# Patient Record
Sex: MALE | Race: WHITE | NOT HISPANIC OR LATINO
[De-identification: names, ages, dates, MRNs, and addresses within clinical notes are randomized per-mention and may not be internally consistent; named-entity substitution may affect disease eponyms.]

---

## 2018-08-22 ENCOUNTER — APPOINTMENT (OUTPATIENT)
Dept: SURGERY | Facility: ASSISTED LIVING FACILITY | Age: 83
End: 2018-08-22
Payer: MEDICARE

## 2018-08-22 PROCEDURE — 99304 1ST NF CARE SF/LOW MDM 25: CPT

## 2018-08-23 PROBLEM — Z00.00 ENCOUNTER FOR PREVENTIVE HEALTH EXAMINATION: Status: ACTIVE | Noted: 2018-08-23

## 2018-08-31 ENCOUNTER — APPOINTMENT (OUTPATIENT)
Dept: SURGERY | Facility: ASSISTED LIVING FACILITY | Age: 83
End: 2018-08-31
Payer: MEDICARE

## 2018-08-31 PROCEDURE — 99307 SBSQ NF CARE SF MDM 10: CPT

## 2018-09-05 ENCOUNTER — APPOINTMENT (OUTPATIENT)
Dept: SURGERY | Facility: ASSISTED LIVING FACILITY | Age: 83
End: 2018-09-05
Payer: MEDICARE

## 2018-09-05 PROCEDURE — 99307 SBSQ NF CARE SF MDM 10: CPT

## 2020-01-02 RX ADMIN — Medication 650 MILLIGRAM(S): at 23:05

## 2020-01-03 ENCOUNTER — INPATIENT (INPATIENT)
Facility: HOSPITAL | Age: 85
LOS: 5 days | Discharge: HOME | DRG: 378 | End: 2020-01-09
Attending: INTERNAL MEDICINE | Admitting: INTERNAL MEDICINE
Payer: MEDICARE

## 2020-01-03 VITALS
OXYGEN SATURATION: 98 % | TEMPERATURE: 99 F | DIASTOLIC BLOOD PRESSURE: 63 MMHG | HEART RATE: 89 BPM | WEIGHT: 121.7 LBS | RESPIRATION RATE: 17 BRPM | SYSTOLIC BLOOD PRESSURE: 154 MMHG

## 2020-01-03 LAB
ALBUMIN SERPL ELPH-MCNC: 2.9 G/DL — LOW (ref 3.3–5)
ALP SERPL-CCNC: 66 U/L — SIGNIFICANT CHANGE UP (ref 40–120)
ALT FLD-CCNC: 24 U/L — SIGNIFICANT CHANGE UP (ref 10–45)
ANION GAP SERPL CALC-SCNC: 12 MMOL/L — SIGNIFICANT CHANGE UP (ref 5–17)
APTT BLD: 35.4 SEC — SIGNIFICANT CHANGE UP (ref 27.5–36.3)
AST SERPL-CCNC: 24 U/L — SIGNIFICANT CHANGE UP (ref 10–40)
BASOPHILS # BLD AUTO: 0 K/UL — SIGNIFICANT CHANGE UP (ref 0–0.2)
BASOPHILS NFR BLD AUTO: 0 % — SIGNIFICANT CHANGE UP (ref 0–2)
BILIRUB SERPL-MCNC: 0.5 MG/DL — SIGNIFICANT CHANGE UP (ref 0.2–1.2)
BLD GP AB SCN SERPL QL: SIGNIFICANT CHANGE UP
BUN SERPL-MCNC: 82 MG/DL — HIGH (ref 7–23)
CALCIUM SERPL-MCNC: 8.8 MG/DL — SIGNIFICANT CHANGE UP (ref 8.4–10.5)
CHLORIDE SERPL-SCNC: 110 MMOL/L — HIGH (ref 96–108)
CO2 SERPL-SCNC: 20 MMOL/L — LOW (ref 22–31)
CREAT SERPL-MCNC: 2.36 MG/DL — HIGH (ref 0.5–1.3)
EOSINOPHIL # BLD AUTO: 0.01 K/UL — SIGNIFICANT CHANGE UP (ref 0–0.5)
EOSINOPHIL NFR BLD AUTO: 0.1 % — SIGNIFICANT CHANGE UP (ref 0–6)
GLUCOSE SERPL-MCNC: 111 MG/DL — HIGH (ref 70–99)
HCT VFR BLD CALC: 25.3 % — LOW (ref 39–50)
HGB BLD-MCNC: 8.2 G/DL — LOW (ref 13–17)
IMM GRANULOCYTES NFR BLD AUTO: 0.6 % — SIGNIFICANT CHANGE UP (ref 0–1.5)
INR BLD: 1.7 RATIO — HIGH (ref 0.88–1.16)
LACTATE SERPL-SCNC: 1.3 MMOL/L — SIGNIFICANT CHANGE UP (ref 0.7–2)
LYMPHOCYTES # BLD AUTO: 0.63 K/UL — LOW (ref 1–3.3)
LYMPHOCYTES # BLD AUTO: 9.4 % — LOW (ref 13–44)
MCHC RBC-ENTMCNC: 32.4 GM/DL — SIGNIFICANT CHANGE UP (ref 32–36)
MCHC RBC-ENTMCNC: 32.8 PG — SIGNIFICANT CHANGE UP (ref 27–34)
MCV RBC AUTO: 101.2 FL — HIGH (ref 80–100)
MONOCYTES # BLD AUTO: 0.69 K/UL — SIGNIFICANT CHANGE UP (ref 0–0.9)
MONOCYTES NFR BLD AUTO: 10.3 % — SIGNIFICANT CHANGE UP (ref 2–14)
NEUTROPHILS # BLD AUTO: 5.35 K/UL — SIGNIFICANT CHANGE UP (ref 1.8–7.4)
NEUTROPHILS NFR BLD AUTO: 79.6 % — HIGH (ref 43–77)
NRBC # BLD: 0 /100 WBCS — SIGNIFICANT CHANGE UP (ref 0–0)
OB PNL STL: POSITIVE
PLATELET # BLD AUTO: 136 K/UL — LOW (ref 150–400)
POTASSIUM SERPL-MCNC: 5.5 MMOL/L — HIGH (ref 3.5–5.3)
POTASSIUM SERPL-SCNC: 5.5 MMOL/L — HIGH (ref 3.5–5.3)
PROT SERPL-MCNC: 6.9 G/DL — SIGNIFICANT CHANGE UP (ref 6–8.3)
PROTHROM AB SERPL-ACNC: 19.4 SEC — HIGH (ref 10–12.9)
RBC # BLD: 2.5 M/UL — LOW (ref 4.2–5.8)
RBC # FLD: 13.7 % — SIGNIFICANT CHANGE UP (ref 10.3–14.5)
SODIUM SERPL-SCNC: 142 MMOL/L — SIGNIFICANT CHANGE UP (ref 135–145)
WBC # BLD: 6.72 K/UL — SIGNIFICANT CHANGE UP (ref 3.8–10.5)
WBC # FLD AUTO: 6.72 K/UL — SIGNIFICANT CHANGE UP (ref 3.8–10.5)

## 2020-01-03 PROCEDURE — 93010 ELECTROCARDIOGRAM REPORT: CPT

## 2020-01-03 PROCEDURE — 71045 X-RAY EXAM CHEST 1 VIEW: CPT | Mod: 26

## 2020-01-03 PROCEDURE — 99291 CRITICAL CARE FIRST HOUR: CPT

## 2020-01-03 RX ORDER — SODIUM CHLORIDE 9 MG/ML
1700 INJECTION INTRAMUSCULAR; INTRAVENOUS; SUBCUTANEOUS ONCE
Refills: 0 | Status: COMPLETED | OUTPATIENT
Start: 2020-01-03 | End: 2020-01-03

## 2020-01-03 RX ORDER — ACETAMINOPHEN 500 MG
650 TABLET ORAL ONCE
Refills: 0 | Status: COMPLETED | OUTPATIENT
Start: 2020-01-03 | End: 2020-01-02

## 2020-01-03 RX ADMIN — SODIUM CHLORIDE 1700 MILLILITER(S): 9 INJECTION INTRAMUSCULAR; INTRAVENOUS; SUBCUTANEOUS at 23:00

## 2020-01-03 NOTE — ED ADULT NURSE NOTE - PMH
Atrial fibrillation    BPH (benign prostatic hyperplasia)    CKD (chronic kidney disease)    Heart disease    HTN (hypertension)    Hyperkalemia    Hyperlipidemia    Insomnia    Pulmonary embolism

## 2020-01-03 NOTE — ED PROVIDER NOTE - CARE PLAN
Principal Discharge DX:	Gastrointestinal hemorrhage with melena  Secondary Diagnosis:	Cystitis with hematuria

## 2020-01-03 NOTE — ED ADULT NURSE NOTE - NSIMPLEMENTINTERV_GEN_ALL_ED
Implemented All Fall with Harm Risk Interventions:  [place] to call system. Call bell, personal items and telephone within reach. Instruct patient to call for assistance. Room bathroom lighting operational. Non-slip footwear when patient is off stretcher. Physically safe environment: no spills, clutter or unnecessary equipment. Stretcher in lowest position, wheels locked, appropriate side rails in place. Provide visual cue, wrist band, yellow gown, etc. Monitor gait and stability. Monitor for mental status changes and reorient to person, place, and time. Review medications for side effects contributing to fall risk. Reinforce activity limits and safety measures with patient and family. Provide visual clues: red socks.

## 2020-01-03 NOTE — ED PROVIDER NOTE - CLINICAL SUMMARY MEDICAL DECISION MAKING FREE TEXT BOX
pt is 89 old with h/o Afib, not on any anticoagulant , P/W gross hematuria and dark stool, with low H/H, pt having acute blood loss from 2 sources, and on CBI, needs frequent monitoring of H/H, iCU consult was called and pt was admitted to ICU, family (son) was called to discuss goals of care but was no answer

## 2020-01-03 NOTE — ED PROVIDER NOTE - OBJECTIVE STATEMENT
89 y/o male with h/o AFIB , BIB ems from [facility] for evaluation of bleeding from penis, pt also has low H/H about HB 0f 7 done today. pt denies any pain. pt is very old and possibly has some dementia so unable to provide detail history.

## 2020-01-03 NOTE — ED ADULT NURSE NOTE - OBJECTIVE STATEMENT
89 yr old male BIB EMS from [facility] for nursing and rehab c/o hematuria and abnormal hemoglobin today; pt denies any complaints at this time

## 2020-01-04 DIAGNOSIS — K92.1 MELENA: ICD-10-CM

## 2020-01-04 LAB
-  CANDIDA ALBICANS: SIGNIFICANT CHANGE UP
-  CANDIDA GLABRATA: SIGNIFICANT CHANGE UP
-  CANDIDA KRUSEI: SIGNIFICANT CHANGE UP
-  CANDIDA PARAPSILOSIS: SIGNIFICANT CHANGE UP
-  CANDIDA TROPICALIS: SIGNIFICANT CHANGE UP
-  COAGULASE NEGATIVE STAPHYLOCOCCUS: SIGNIFICANT CHANGE UP
-  K. PNEUMONIAE GROUP: SIGNIFICANT CHANGE UP
-  KPC RESISTANCE GENE: SIGNIFICANT CHANGE UP
-  STREPTOCOCCUS SP. (NOT GRP A, B OR S PNEUMONIAE): SIGNIFICANT CHANGE UP
A BAUMANNII DNA SPEC QL NAA+PROBE: SIGNIFICANT CHANGE UP
ANION GAP SERPL CALC-SCNC: 11 MMOL/L — SIGNIFICANT CHANGE UP (ref 5–17)
ANION GAP SERPL CALC-SCNC: 11 MMOL/L — SIGNIFICANT CHANGE UP (ref 5–17)
APPEARANCE UR: ABNORMAL
BACTERIA # UR AUTO: ABNORMAL /HPF
BILIRUB UR-MCNC: NEGATIVE — SIGNIFICANT CHANGE UP
BUN SERPL-MCNC: 72 MG/DL — HIGH (ref 7–23)
BUN SERPL-MCNC: 77 MG/DL — HIGH (ref 7–23)
CALCIUM SERPL-MCNC: 8.3 MG/DL — LOW (ref 8.4–10.5)
CALCIUM SERPL-MCNC: 8.3 MG/DL — LOW (ref 8.4–10.5)
CHLORIDE SERPL-SCNC: 114 MMOL/L — HIGH (ref 96–108)
CHLORIDE SERPL-SCNC: 114 MMOL/L — HIGH (ref 96–108)
CO2 SERPL-SCNC: 20 MMOL/L — LOW (ref 22–31)
CO2 SERPL-SCNC: 21 MMOL/L — LOW (ref 22–31)
COLOR SPEC: ABNORMAL
CREAT SERPL-MCNC: 2.07 MG/DL — HIGH (ref 0.5–1.3)
CREAT SERPL-MCNC: 2.16 MG/DL — HIGH (ref 0.5–1.3)
DIFF PNL FLD: ABNORMAL
E CLOAC COMP DNA BLD POS QL NAA+PROBE: SIGNIFICANT CHANGE UP
E COLI DNA BLD POS QL NAA+NON-PROBE: SIGNIFICANT CHANGE UP
ENTEROCOC DNA BLD POS QL NAA+NON-PROBE: SIGNIFICANT CHANGE UP
ENTEROCOC DNA BLD POS QL NAA+NON-PROBE: SIGNIFICANT CHANGE UP
EPI CELLS # UR: SIGNIFICANT CHANGE UP
GLUCOSE SERPL-MCNC: 121 MG/DL — HIGH (ref 70–99)
GLUCOSE SERPL-MCNC: 98 MG/DL — SIGNIFICANT CHANGE UP (ref 70–99)
GLUCOSE UR QL: NEGATIVE — SIGNIFICANT CHANGE UP
GP B STREP DNA BLD POS QL NAA+NON-PROBE: SIGNIFICANT CHANGE UP
GRAM STN FLD: SIGNIFICANT CHANGE UP
HAEM INFLU DNA BLD POS QL NAA+NON-PROBE: SIGNIFICANT CHANGE UP
HCT VFR BLD CALC: 20.6 % — CRITICAL LOW (ref 39–50)
HCT VFR BLD CALC: 27.7 % — LOW (ref 39–50)
HGB BLD-MCNC: 6.6 G/DL — CRITICAL LOW (ref 13–17)
HGB BLD-MCNC: 9.2 G/DL — LOW (ref 13–17)
INR BLD: 1.62 RATIO — HIGH (ref 0.88–1.16)
K OXYTOCA DNA BLD POS QL NAA+NON-PROBE: SIGNIFICANT CHANGE UP
KETONES UR-MCNC: ABNORMAL
L MONOCYTOG DNA BLD POS QL NAA+NON-PROBE: SIGNIFICANT CHANGE UP
LEUKOCYTE ESTERASE UR-ACNC: ABNORMAL
MAGNESIUM SERPL-MCNC: 2.4 MG/DL — SIGNIFICANT CHANGE UP (ref 1.6–2.6)
MCHC RBC-ENTMCNC: 32 GM/DL — SIGNIFICANT CHANGE UP (ref 32–36)
MCHC RBC-ENTMCNC: 32.7 PG — SIGNIFICANT CHANGE UP (ref 27–34)
MCHC RBC-ENTMCNC: 32.7 PG — SIGNIFICANT CHANGE UP (ref 27–34)
MCHC RBC-ENTMCNC: 33.2 GM/DL — SIGNIFICANT CHANGE UP (ref 32–36)
MCV RBC AUTO: 102 FL — HIGH (ref 80–100)
MCV RBC AUTO: 98.6 FL — SIGNIFICANT CHANGE UP (ref 80–100)
METHOD TYPE: SIGNIFICANT CHANGE UP
MRSA SPEC QL CULT: SIGNIFICANT CHANGE UP
MSSA DNA SPEC QL NAA+PROBE: SIGNIFICANT CHANGE UP
N MEN ISLT CULT: SIGNIFICANT CHANGE UP
NITRITE UR-MCNC: NEGATIVE — SIGNIFICANT CHANGE UP
NRBC # BLD: 0 /100 WBCS — SIGNIFICANT CHANGE UP (ref 0–0)
NRBC # BLD: 0 /100 WBCS — SIGNIFICANT CHANGE UP (ref 0–0)
P AERUGINOSA DNA BLD POS NAA+NON-PROBE: SIGNIFICANT CHANGE UP
PH UR: 9 — HIGH (ref 5–8)
PHOSPHATE SERPL-MCNC: 5.1 MG/DL — HIGH (ref 2.5–4.5)
PLATELET # BLD AUTO: 113 K/UL — LOW (ref 150–400)
PLATELET # BLD AUTO: 119 K/UL — LOW (ref 150–400)
POTASSIUM SERPL-MCNC: 4.9 MMOL/L — SIGNIFICANT CHANGE UP (ref 3.5–5.3)
POTASSIUM SERPL-MCNC: 5.5 MMOL/L — HIGH (ref 3.5–5.3)
POTASSIUM SERPL-SCNC: 4.9 MMOL/L — SIGNIFICANT CHANGE UP (ref 3.5–5.3)
POTASSIUM SERPL-SCNC: 5.5 MMOL/L — HIGH (ref 3.5–5.3)
PROCALCITONIN SERPL-MCNC: 0.59 NG/ML — HIGH
PROT UR-MCNC: 500 MG/DL
PROTHROM AB SERPL-ACNC: 18.4 SEC — HIGH (ref 10–12.9)
RBC # BLD: 2.02 M/UL — LOW (ref 4.2–5.8)
RBC # BLD: 2.81 M/UL — LOW (ref 4.2–5.8)
RBC # FLD: 13.9 % — SIGNIFICANT CHANGE UP (ref 10.3–14.5)
RBC # FLD: 15 % — HIGH (ref 10.3–14.5)
RBC CASTS # UR COMP ASSIST: >50 /HPF (ref 0–4)
S MARCESCENS DNA BLD POS NAA+NON-PROBE: SIGNIFICANT CHANGE UP
S PNEUM DNA BLD POS QL NAA+NON-PROBE: SIGNIFICANT CHANGE UP
S PYO DNA BLD POS QL NAA+NON-PROBE: SIGNIFICANT CHANGE UP
SODIUM SERPL-SCNC: 145 MMOL/L — SIGNIFICANT CHANGE UP (ref 135–145)
SODIUM SERPL-SCNC: 146 MMOL/L — HIGH (ref 135–145)
SP GR SPEC: 1.01 — SIGNIFICANT CHANGE UP (ref 1.01–1.02)
SPECIMEN SOURCE: SIGNIFICANT CHANGE UP
UROBILINOGEN FLD QL: NEGATIVE — SIGNIFICANT CHANGE UP
WBC # BLD: 4.31 K/UL — SIGNIFICANT CHANGE UP (ref 3.8–10.5)
WBC # BLD: 7.72 K/UL — SIGNIFICANT CHANGE UP (ref 3.8–10.5)
WBC # FLD AUTO: 4.31 K/UL — SIGNIFICANT CHANGE UP (ref 3.8–10.5)
WBC # FLD AUTO: 7.72 K/UL — SIGNIFICANT CHANGE UP (ref 3.8–10.5)
WBC UR QL: ABNORMAL /HPF (ref 0–5)

## 2020-01-04 PROCEDURE — 74176 CT ABD & PELVIS W/O CONTRAST: CPT | Mod: 26

## 2020-01-04 PROCEDURE — 76770 US EXAM ABDO BACK WALL COMP: CPT | Mod: 26

## 2020-01-04 RX ORDER — INSULIN HUMAN 100 [IU]/ML
10 INJECTION, SOLUTION SUBCUTANEOUS ONCE
Refills: 0 | Status: COMPLETED | OUTPATIENT
Start: 2020-01-04 | End: 2020-01-04

## 2020-01-04 RX ORDER — CHLORHEXIDINE GLUCONATE 213 G/1000ML
1 SOLUTION TOPICAL
Refills: 0 | Status: DISCONTINUED | OUTPATIENT
Start: 2020-01-04 | End: 2020-01-05

## 2020-01-04 RX ORDER — ESCITALOPRAM OXALATE 10 MG/1
0 TABLET, FILM COATED ORAL
Qty: 0 | Refills: 0 | DISCHARGE

## 2020-01-04 RX ORDER — DEXTROSE 50 % IN WATER 50 %
50 SYRINGE (ML) INTRAVENOUS ONCE
Refills: 0 | Status: COMPLETED | OUTPATIENT
Start: 2020-01-04 | End: 2020-01-04

## 2020-01-04 RX ORDER — PANTOPRAZOLE SODIUM 20 MG/1
40 TABLET, DELAYED RELEASE ORAL
Refills: 0 | Status: DISCONTINUED | OUTPATIENT
Start: 2020-01-04 | End: 2020-01-09

## 2020-01-04 RX ORDER — FERROUS SULFATE 325(65) MG
0 TABLET ORAL
Qty: 0 | Refills: 0 | DISCHARGE

## 2020-01-04 RX ORDER — SENNA PLUS 8.6 MG/1
1 TABLET ORAL
Qty: 0 | Refills: 0 | DISCHARGE

## 2020-01-04 RX ORDER — LANOLIN ALCOHOL/MO/W.PET/CERES
1 CREAM (GRAM) TOPICAL
Qty: 0 | Refills: 0 | DISCHARGE

## 2020-01-04 RX ORDER — PIPERACILLIN AND TAZOBACTAM 4; .5 G/20ML; G/20ML
3.38 INJECTION, POWDER, LYOPHILIZED, FOR SOLUTION INTRAVENOUS EVERY 8 HOURS
Refills: 0 | Status: DISCONTINUED | OUTPATIENT
Start: 2020-01-04 | End: 2020-01-05

## 2020-01-04 RX ORDER — PHYTONADIONE (VIT K1) 5 MG
5 TABLET ORAL ONCE
Refills: 0 | Status: COMPLETED | OUTPATIENT
Start: 2020-01-04 | End: 2020-01-04

## 2020-01-04 RX ORDER — CEFTRIAXONE 500 MG/1
1000 INJECTION, POWDER, FOR SOLUTION INTRAMUSCULAR; INTRAVENOUS ONCE
Refills: 0 | Status: COMPLETED | OUTPATIENT
Start: 2020-01-04 | End: 2020-01-04

## 2020-01-04 RX ORDER — ESCITALOPRAM OXALATE 10 MG/1
5 TABLET, FILM COATED ORAL DAILY
Refills: 0 | Status: DISCONTINUED | OUTPATIENT
Start: 2020-01-04 | End: 2020-01-09

## 2020-01-04 RX ORDER — PANTOPRAZOLE SODIUM 20 MG/1
80 TABLET, DELAYED RELEASE ORAL ONCE
Refills: 0 | Status: COMPLETED | OUTPATIENT
Start: 2020-01-04 | End: 2020-01-04

## 2020-01-04 RX ORDER — PIPERACILLIN AND TAZOBACTAM 4; .5 G/20ML; G/20ML
3.38 INJECTION, POWDER, LYOPHILIZED, FOR SOLUTION INTRAVENOUS ONCE
Refills: 0 | Status: COMPLETED | OUTPATIENT
Start: 2020-01-04 | End: 2020-01-04

## 2020-01-04 RX ORDER — AMLODIPINE BESYLATE 2.5 MG/1
1 TABLET ORAL
Qty: 0 | Refills: 0 | DISCHARGE

## 2020-01-04 RX ORDER — CEFTRIAXONE 500 MG/1
1000 INJECTION, POWDER, FOR SOLUTION INTRAMUSCULAR; INTRAVENOUS EVERY 24 HOURS
Refills: 0 | Status: DISCONTINUED | OUTPATIENT
Start: 2020-01-04 | End: 2020-01-09

## 2020-01-04 RX ADMIN — CHLORHEXIDINE GLUCONATE 1 APPLICATION(S): 213 SOLUTION TOPICAL at 06:21

## 2020-01-04 RX ADMIN — Medication 650 MILLIGRAM(S): at 00:05

## 2020-01-04 RX ADMIN — CEFTRIAXONE 100 MILLIGRAM(S): 500 INJECTION, POWDER, FOR SOLUTION INTRAMUSCULAR; INTRAVENOUS at 00:29

## 2020-01-04 RX ADMIN — CEFTRIAXONE 100 MILLIGRAM(S): 500 INJECTION, POWDER, FOR SOLUTION INTRAMUSCULAR; INTRAVENOUS at 16:00

## 2020-01-04 RX ADMIN — Medication 101 MILLIGRAM(S): at 03:24

## 2020-01-04 RX ADMIN — Medication 50 MILLILITER(S): at 08:04

## 2020-01-04 RX ADMIN — PIPERACILLIN AND TAZOBACTAM 200 GRAM(S): 4; .5 INJECTION, POWDER, LYOPHILIZED, FOR SOLUTION INTRAVENOUS at 23:03

## 2020-01-04 RX ADMIN — PANTOPRAZOLE SODIUM 80 MILLIGRAM(S): 20 TABLET, DELAYED RELEASE ORAL at 00:29

## 2020-01-04 RX ADMIN — INSULIN HUMAN 10 UNIT(S): 100 INJECTION, SOLUTION SUBCUTANEOUS at 08:04

## 2020-01-04 RX ADMIN — ESCITALOPRAM OXALATE 5 MILLIGRAM(S): 10 TABLET, FILM COATED ORAL at 12:11

## 2020-01-04 RX ADMIN — PANTOPRAZOLE SODIUM 40 MILLIGRAM(S): 20 TABLET, DELAYED RELEASE ORAL at 17:16

## 2020-01-04 RX ADMIN — SODIUM CHLORIDE 1700 MILLILITER(S): 9 INJECTION INTRAMUSCULAR; INTRAVENOUS; SUBCUTANEOUS at 00:00

## 2020-01-04 RX ADMIN — PANTOPRAZOLE SODIUM 40 MILLIGRAM(S): 20 TABLET, DELAYED RELEASE ORAL at 05:14

## 2020-01-04 RX ADMIN — CEFTRIAXONE 1000 MILLIGRAM(S): 500 INJECTION, POWDER, FOR SOLUTION INTRAMUSCULAR; INTRAVENOUS at 00:59

## 2020-01-04 NOTE — PROVIDER CONTACT NOTE (EICU) - BACKGROUND
a 89M with a history of A fib, unclear whether he is on ASA or AC at baseline p/w bleeding from penis and UGIB as per ED attg.  Hemodynamics stable, Hb stable at 8.2, however, INR 1.7, PTT 34, Cr 2.4, K 5.5 (unclear baseline), but likely acute.  Lactate 1.3.  As per ED attg, pt may have a DNR order, but there was no paperwork sent from NH and he was not able to contact family.

## 2020-01-04 NOTE — H&P ADULT - NSICDXPASTMEDICALHX_GEN_ALL_CORE_FT
PAST MEDICAL HISTORY:  Atrial fibrillation     BPH (benign prostatic hyperplasia)     CKD (chronic kidney disease)     Heart disease     HTN (hypertension)     Hyperkalemia     Hyperlipidemia     Insomnia     Pulmonary embolism

## 2020-01-04 NOTE — PATIENT PROFILE ADULT - OVER THE PAST TWO WEEKS HAVE YOU FELT DOWN, DEPRESSED OR HOPELESS?
Call from Dr Cruz with [facility] for Reclast 5mg yearly for Dx of Osteoporosis, and he notes he does not feel that any other oral medication would be effective due to hx of Gastric Bypass, he feels she wouldn't absorb. Plan entered. Note to [facility] to call patient and get her on the schedule.   
no

## 2020-01-04 NOTE — H&P ADULT - NSHPPHYSICALEXAM_GEN_ALL_CORE
GENERAL: elderly cachetic NAD, well-groomed, well-developed  HEAD:  Atraumatic, Normocephalic  EYES: EOMI, PERRLA, conjunctiva and sclera clear  ENMT: Moist mucous membranes, Good dentition, No lesions  NECK: Supple, No JVD, Normal thyroid  NERVOUS SYSTEM:  Alert & Oriented X3, Good concentration; Motor Strength 5/5 B/L upper and lower extremities; DTRs 2+ intact and symmetric  CHEST/LUNG: CTA bilaterally; No rales, rhonchi, wheezing, or rubs  HEART: Regular rate and rhythm; No murmurs, rubs, or gallops  ABDOMEN: Soft, +tenderness to deep palpation in RLQ, Nondistended; Bowel sounds present + melena + hematuria from batista  EXTREMITIES:  2+ Peripheral Pulses, No clubbing, cyanosis, or edema

## 2020-01-04 NOTE — PROVIDER CONTACT NOTE (EICU) - RECOMMENDATIONS
1. Consider palliative care consult.  2.  No indication of RBC transfusion at this moment.  3. Hold AC.

## 2020-01-04 NOTE — H&P ADULT - ASSESSMENT
ASSESSMENT   89 y/o M PMH Afib BIBEMS from [facility] for evaluation of bleeding from penis, pt also has low H/H about hbg of 7 done today. Pt was noted to have bout of melena in ED as well as gross hematuria. Pt placed on CBI for irrigation. Pt has elevated INR 1.7, but denies taking AC any time recently, states was on coumadin, but was stopped many years ago. Pt admitted to the ICU for ABLA and coagulopathy with hematuria and melena. Will monitor closely and trend CBC, and continuous irrigation with CBI  1. ABLA   2. Melena   3. GIB   4. Hematuria   5. WILVER    PLAN     NEURO:   -Continue lexapro    PULM:    -WILY     CV:   -afib rate controlled at this time    GI:    -NPO   -Advance diet as tolerated   -Protonix BID     :   -CBI    ENDO:   -WILY    ID:   WILY     HEME/DVT PPX:   SCD  -trend CBC q6h   -Vit K   -FFP     ETHICS        CODE STATUS: DNR  GOC discussion: Y    Critical Care time: 40 mins assessing presenting problems of acute illness that poses high probability of life threatening deterioration or end organ damage/dysfunction.  Medical decision making including Initiating plan of care, reviewing data, reviewing radiology, direct patient bedside evaluation and interpretation of vital signs, any necessary ventilator management , discussion with multidisciplinary team, discussing goals of care with patient/family, all non inclusive of procedures     Care discussed with bedside provider and eICU attending. If any additional assistance in care/management of patient required by bedside team, provider/RN/family member advised to push "e-alert" button in patient's room, and details will be discussed at that time

## 2020-01-04 NOTE — H&P ADULT - NSHPLABSRESULTS_GEN_ALL_CORE
Lab Results:  CBC  CBC Full  -  ( 2020 23:00 )  WBC Count : 6.72 K/uL  RBC Count : 2.50 M/uL  Hemoglobin : 8.2 g/dL  Hematocrit : 25.3 %  Platelet Count - Automated : 136 K/uL  Mean Cell Volume : 101.2 fl  Mean Cell Hemoglobin : 32.8 pg  Mean Cell Hemoglobin Concentration : 32.4 gm/dL  Auto Neutrophil # : 5.35 K/uL  Auto Lymphocyte # : 0.63 K/uL  Auto Monocyte # : 0.69 K/uL  Auto Eosinophil # : 0.01 K/uL  Auto Basophil # : 0.00 K/uL  Auto Neutrophil % : 79.6 %  Auto Lymphocyte % : 9.4 %  Auto Monocyte % : 10.3 %  Auto Eosinophil % : 0.1 %  Auto Basophil % : 0.0 %    .		Differential:	[] Automated		[] Manual  Chemistry                        8.2    6.72  )-----------( 136      ( 2020 23:00 )             25.3     01-03    142  |  110<H>  |  82<H>  ----------------------------<  111<H>  5.5<H>   |  20<L>  |  2.36<H>    Ca    8.8      2020 23:00    TPro  6.9  /  Alb  2.9<L>  /  TBili  0.5  /  DBili  x   /  AST  24  /  ALT  24  /  AlkPhos  66  01-03    LIVER FUNCTIONS - ( 2020 23:00 )  Alb: 2.9 g/dL / Pro: 6.9 g/dL / ALK PHOS: 66 U/L / ALT: 24 U/L / AST: 24 U/L / GGT: x           PT/INR - ( 2020 23:00 )   PT: 19.4 sec;   INR: 1.70 ratio         PTT - ( 2020 23:00 )  PTT:35.4 sec  Urinalysis Basic - ( 2020 00:26 )    Color: Red / Appearance: bloody / S.015 / pH: x  Gluc: x / Ketone: Trace  / Bili: Negative / Urobili: Negative   Blood: x / Protein: 500 mg/dL / Nitrite: Negative   Leuk Esterase: Trace / RBC: >50 /HPF / WBC 6-10 /HPF   Sq Epi: x / Non Sq Epi: Neg.-Few / Bacteria: TNTC /HPF      MICROBIOLOGY/CULTURES:      RADIOLOGY RESULTS:  pending CT abdomen

## 2020-01-04 NOTE — H&P ADULT - HISTORY OF PRESENT ILLNESS
89 y/o M PMH Afib BIBEMS from NH for evaluation of bleeding from penis, pt also has low H/H about hbg of 7 done today. Pt was noted to have bout of melena in ED as well as gross hematuria. Pt placed on CBI for irrigation. Pt has elevated INR 1.7, but denies taking AC any time recently, states was on coumadin, but was stopped many years ago. Pt admitted to the ICU for ABLA and coagulopathy with hematuria and melena. Will monitor closely and trend CBC, and continuous irrigation with CBI

## 2020-01-04 NOTE — PROGRESS NOTE ADULT - ASSESSMENT
Physical Exam  GENERAL: elderly cachetic NAD, well-groomed, well-developed  HEAD:  Atraumatic, Normocephalic  EYES: EOMI, PERRLA, conjunctiva and sclera clear  ENMT: Moist mucous membranes, Good dentition, No lesions  NECK: Supple, No JVD, Normal thyroid  NERVOUS SYSTEM:  Alert & Oriented X3,Motor Strength 5/5 B/L upper and lower extremities; DTRs 2+ intact and symmetric  CHEST/LUNG: CTA bilaterally; No rales, rhonchi, wheezing, or rubs  HEART: Regular rate and rhythm; No murmurs, rubs, or gallops  ABDOMEN: Soft, +tenderness to deep palpation in RLQ, Nondistended; Bowel sounds present, + batista with blood tinged urine  EXTREMITIES:  2+ Peripheral Pulses, No clubbing, cyanosis, or edema    Assessment:  1. Hematuria   2. Acute blood loss anemia  3. Melena   4. CAD  5. Afib  6. Hx of bradycardia   7. Chronic kidney disease stage 4    - Cont. to monitor hematuria  - Check US of the kidney/bladder  - Urology consult  - Cont. CBI  - Monitor hgb   - Cont. Monitor heart rate, history of bradycardia per NH papers, hemodynamically stable for now  - Hold all AV sarmad agents, avoid aspirin given bradycardia  - Cont home psych medications  - Monitor urine output, history of CKD stage 4 at home, likely baseline creatinine  - SCD for DVT prophylaxis   - Oral diet

## 2020-01-04 NOTE — ED ADULT NURSE REASSESSMENT NOTE - NS ED NURSE REASSESS COMMENT FT1
CBI in progress; some small clots removed; pt tolerating well; catheter patent at this time; draining well; continue to monitor

## 2020-01-04 NOTE — PROGRESS NOTE ADULT - SUBJECTIVE AND OBJECTIVE BOX
Follow-up Critical Care Progress Note  Chief Complaint : Melena    Admitted overnight with hematuria. Appears comfortable, has CBI underway. Denies any pain, any abdominal pain, no nausea or vomiting. Reported with bradycardia    Allergies :aspirin (Unknown)  penicillin (Unknown)    PAST MEDICAL & SURGICAL HISTORY:  Insomnia  Hyperkalemia  CKD (chronic kidney disease)  BPH (benign prostatic hyperplasia)  Hyperlipidemia  Pulmonary embolism  Heart disease  HTN (hypertension)  Atrial fibrillation    Medications:  MEDICATIONS  (STANDING):  chlorhexidine 4% Liquid 1 Application(s) Topical <User Schedule>  escitalopram 5 milliGRAM(s) Oral daily  pantoprazole  Injectable 40 milliGRAM(s) IV Push two times a day    MEDICATIONS  (PRN):    LABS:                      6.6    4.31  )-----------( 113      ( 2020 04:30 )             20.6     01-04  145  |  114<H>  |  77<H>  ----------------------------<  121<H>  5.5<H>   |  20<L>  |  2.16<H>    Ca    8.3<L>      2020 04:30  Phos  5.1       Mg     2.4     -04    TPro  6.9  /  Alb  2.9<L>  /  TBili  0.5  /  DBili  x   /  AST  24  /  ALT  24  /  AlkPhos  66  01-03    PT/INR - ( 2020 04:30 )   PT: 18.4 sec;   INR: 1.62 ratio       PTT - ( 2020 23:00 )  PTT:35.4 sec  Urinalysis Basic - ( 2020 00:26 )    Color: Red / Appearance: bloody / S.015 / pH: x  Gluc: x / Ketone: Trace  / Bili: Negative / Urobili: Negative   Blood: x / Protein: 500 mg/dL / Nitrite: Negative   Leuk Esterase: Trace / RBC: >50 /HPF / WBC 6-10 /HPF   Sq Epi: x / Non Sq Epi: Neg.-Few / Bacteria: TNTC /HPF    Procalcitonin, Serum: 0.59 ng/mL (20 @ 23:00)    VITALS:  T(C): 36.7 (20 @ 07:00), Max: 38.1 (20 @ 23:00)  T(F): 98 (20 @ 07:00), Max: 100.6 (20 @ 23:00)  HR: 50 (20 09:00) (50 - 93)  BP: 116/56 (20 @ 09:00) (108/44 - 164/60)  BP(mean): 74 (20 09:00) (61 - 83)  ABP: --  ABP(mean): --  RR: 17 (20 @ 09:00) (15 - 29)  SpO2: 100% (20 09:00) (96% - 100%)  CVP(mm Hg): --  CVP(cm H2O): --    Ins and Outs     20 @ 07:01  -  20 @ 07:00  --------------------------------------------------------  IN: 9329 mL / OUT: 54904 mL / NET: -1171 mL        Height (cm): 167.6 (20 @ 02:15)  Weight (kg): 55.1 (20 @ 02:15)  BMI (kg/m2): 19.6 (20 @ 02:15)        I&O's Detail    2020 07:01  -  2020 07:00  --------------------------------------------------------  IN:    Continuous Bladder Irrigation: 9000 mL    Plasma: 329 mL  Total IN: 9329 mL    OUT:    Continuous Bladder Irrigation: 90855 mL    Voided: 400 mL  Total OUT: 95011 mL    Total NET: -1171 mL

## 2020-01-05 LAB
ANION GAP SERPL CALC-SCNC: 13 MMOL/L — SIGNIFICANT CHANGE UP (ref 5–17)
BUN SERPL-MCNC: 66 MG/DL — HIGH (ref 7–23)
CALCIUM SERPL-MCNC: 8.6 MG/DL — SIGNIFICANT CHANGE UP (ref 8.4–10.5)
CHLORIDE SERPL-SCNC: 117 MMOL/L — HIGH (ref 96–108)
CO2 SERPL-SCNC: 19 MMOL/L — LOW (ref 22–31)
CREAT SERPL-MCNC: 1.98 MG/DL — HIGH (ref 0.5–1.3)
GLUCOSE SERPL-MCNC: 85 MG/DL — SIGNIFICANT CHANGE UP (ref 70–99)
HCT VFR BLD CALC: 26.6 % — LOW (ref 39–50)
HCT VFR BLD CALC: 27 % — LOW (ref 39–50)
HGB BLD-MCNC: 8.7 G/DL — LOW (ref 13–17)
HGB BLD-MCNC: 8.8 G/DL — LOW (ref 13–17)
INR BLD: 1.25 RATIO — HIGH (ref 0.88–1.16)
MAGNESIUM SERPL-MCNC: 2.5 MG/DL — SIGNIFICANT CHANGE UP (ref 1.6–2.6)
MCHC RBC-ENTMCNC: 32.5 PG — SIGNIFICANT CHANGE UP (ref 27–34)
MCHC RBC-ENTMCNC: 32.5 PG — SIGNIFICANT CHANGE UP (ref 27–34)
MCHC RBC-ENTMCNC: 32.6 GM/DL — SIGNIFICANT CHANGE UP (ref 32–36)
MCHC RBC-ENTMCNC: 32.7 GM/DL — SIGNIFICANT CHANGE UP (ref 32–36)
MCV RBC AUTO: 99.3 FL — SIGNIFICANT CHANGE UP (ref 80–100)
MCV RBC AUTO: 99.6 FL — SIGNIFICANT CHANGE UP (ref 80–100)
NRBC # BLD: 0 /100 WBCS — SIGNIFICANT CHANGE UP (ref 0–0)
NRBC # BLD: 0 /100 WBCS — SIGNIFICANT CHANGE UP (ref 0–0)
PHOSPHATE SERPL-MCNC: 4.4 MG/DL — SIGNIFICANT CHANGE UP (ref 2.5–4.5)
PLATELET # BLD AUTO: 118 K/UL — LOW (ref 150–400)
PLATELET # BLD AUTO: 118 K/UL — LOW (ref 150–400)
POTASSIUM SERPL-MCNC: 4.4 MMOL/L — SIGNIFICANT CHANGE UP (ref 3.5–5.3)
POTASSIUM SERPL-SCNC: 4.4 MMOL/L — SIGNIFICANT CHANGE UP (ref 3.5–5.3)
PROTHROM AB SERPL-ACNC: 14.1 SEC — HIGH (ref 10–12.9)
RBC # BLD: 2.68 M/UL — LOW (ref 4.2–5.8)
RBC # BLD: 2.71 M/UL — LOW (ref 4.2–5.8)
RBC # FLD: 15.1 % — HIGH (ref 10.3–14.5)
RBC # FLD: 15.1 % — HIGH (ref 10.3–14.5)
SODIUM SERPL-SCNC: 149 MMOL/L — HIGH (ref 135–145)
WBC # BLD: 5.59 K/UL — SIGNIFICANT CHANGE UP (ref 3.8–10.5)
WBC # BLD: 6.46 K/UL — SIGNIFICANT CHANGE UP (ref 3.8–10.5)
WBC # FLD AUTO: 5.59 K/UL — SIGNIFICANT CHANGE UP (ref 3.8–10.5)
WBC # FLD AUTO: 6.46 K/UL — SIGNIFICANT CHANGE UP (ref 3.8–10.5)

## 2020-01-05 PROCEDURE — 99233 SBSQ HOSP IP/OBS HIGH 50: CPT | Mod: GC

## 2020-01-05 RX ORDER — LISINOPRIL 2.5 MG/1
10 TABLET ORAL DAILY
Refills: 0 | Status: DISCONTINUED | OUTPATIENT
Start: 2020-01-05 | End: 2020-01-07

## 2020-01-05 RX ORDER — AMLODIPINE BESYLATE 2.5 MG/1
10 TABLET ORAL DAILY
Refills: 0 | Status: DISCONTINUED | OUTPATIENT
Start: 2020-01-05 | End: 2020-01-09

## 2020-01-05 RX ADMIN — ESCITALOPRAM OXALATE 5 MILLIGRAM(S): 10 TABLET, FILM COATED ORAL at 12:01

## 2020-01-05 RX ADMIN — PANTOPRAZOLE SODIUM 40 MILLIGRAM(S): 20 TABLET, DELAYED RELEASE ORAL at 05:37

## 2020-01-05 RX ADMIN — PANTOPRAZOLE SODIUM 40 MILLIGRAM(S): 20 TABLET, DELAYED RELEASE ORAL at 18:41

## 2020-01-05 RX ADMIN — CEFTRIAXONE 100 MILLIGRAM(S): 500 INJECTION, POWDER, FOR SOLUTION INTRAMUSCULAR; INTRAVENOUS at 16:41

## 2020-01-05 RX ADMIN — PIPERACILLIN AND TAZOBACTAM 25 GRAM(S): 4; .5 INJECTION, POWDER, LYOPHILIZED, FOR SOLUTION INTRAVENOUS at 05:37

## 2020-01-05 NOTE — PROGRESS NOTE ADULT - ASSESSMENT
89 y/o M PMH Afib, HTN brought from NH for evaluation of bleeding from penis, pt also had low H/H about hbg of 7. Pt was noted to have bout of melena in ED as well as gross hematuria. Pt placed on CBI for irrigation. Pt had elevated INR 1.7, but denied taking AC any time recently, states was on coumadin, but was stopped many years ago. Pt admitted to the ICU for ABLA and coagulopathy with hematuria and melena. Transferred to tele 1/4.     Acute blood loss anemia, secondary to hematuria and GI bleed  - Improved s/p 2 units PRBCs  - Monitor CBC closely   - CBI for hematuria  - GI consult, urology consult    Coagulopathy   - s/p FFP, vitamin K  - Will repeat INR today     Bacteremia  - blood culture with GNR  - Had been on ceftriaxone, and zosyn added overnight   - Will proceed with ceftriaxone for now, f/u final blood culture sensitivities  - Afebrile, WBCs normal    HTN  - At home on lisinopril 10 and norvasc 10  - Will restart today    Afib  - not on AC  - Hx of bradycardia-- not on rate control at home. HR elevated now could be 2/2 hypovolemia, will repeat EKG    Hx of bradycardia   - monitor tele     Chronic kidney disease stage 4  - Cr improving, unknown baseline Cr  - Monitor    Depression  - lexapro    DVT ppx  - scds

## 2020-01-05 NOTE — PROGRESS NOTE ADULT - ASSESSMENT
Physical Exam  GENERAL: elderly cachetic NAD, well-groomed, well-developed  HEAD:  Atraumatic, Normocephalic  EYES: EOMI, PERRLA, conjunctiva and sclera clear  ENMT: Moist mucous membranes, Good dentition, No lesions  NECK: Supple, No JVD, Normal thyroid  NERVOUS SYSTEM:  Alert & Oriented X3,Motor Strength 5/5 B/L upper and lower extremities; DTRs 2+ intact and symmetric  CHEST/LUNG: CTA bilaterally; No rales, rhonchi, wheezing, or rubs  HEART: Regular rate and rhythm; No murmurs, rubs, or gallops  ABDOMEN: Soft, +tenderness to deep palpation in RLQ, Nondistended; Bowel sounds present, + batista with blood tinged urine  EXTREMITIES:  2+ Peripheral Pulses, No clubbing, cyanosis, or edema    Assessment:  1. Hematuria   2. Acute blood loss anemia  3. Melena   4. CAD  5. Afib  6. Hx of bradycardia   7. Chronic kidney disease stage 4  8. Gram negative bacteremia  9. Hemorrhagic cystitis     - Urology consult recs noted  - Cont. to monitor hematuria  - Cont. CBI  - Monitor hgb   - Cont. ceftriaxone for now and follow up culture results  - Cont. Monitor heart rate, history of bradycardia per NH papers, hemodynamically stable for now  - Hold all AV sarmad agents, avoid aspirin given bradycardia  - Cont home psych medications  - Monitor urine output, history of CKD stage 4 at home, likely baseline creatinine  - SCD for DVT prophylaxis   - Oral diet  - Cont. management per primary team

## 2020-01-05 NOTE — PROGRESS NOTE ADULT - SUBJECTIVE AND OBJECTIVE BOX
Follow-up Critical Care Progress Note  Chief Complaint : Melena    Awake and alert. Still with hematuria    Allergies :aspirin (Unknown)  penicillin (Unknown)      PAST MEDICAL & SURGICAL HISTORY:  Insomnia  Hyperkalemia  CKD (chronic kidney disease)  BPH (benign prostatic hyperplasia)  Hyperlipidemia  Pulmonary embolism  Heart disease  HTN (hypertension)  Atrial fibrillation      Medications:  MEDICATIONS  (STANDING):  amLODIPine   Tablet 10 milliGRAM(s) Oral daily  cefTRIAXone   IVPB 1000 milliGRAM(s) IV Intermittent every 24 hours  escitalopram 5 milliGRAM(s) Oral daily  lisinopril 10 milliGRAM(s) Oral daily  pantoprazole  Injectable 40 milliGRAM(s) IV Push two times a day    MEDICATIONS  (PRN):      LABS:                        8.8    6.46  )-----------( 118      ( 2020 06:00 )             27.0     01-    149<H>  |  117<H>  |  66<H>  ----------------------------<  85  4.4   |  19<L>  |  1.98<H>    Ca    8.6      2020 06:00  Phos  4.4     -  Mg     2.5     -    TPro  6.9  /  Alb  2.9<L>  /  TBili  0.5  /  DBili  x   /  AST  24  /  ALT  24  /  AlkPhos  66  01-03            PT/INR - ( 2020 04:30 )   PT: 18.4 sec;   INR: 1.62 ratio         PTT - ( 2020 23:00 )  PTT:35.4 sec  Urinalysis Basic - ( 2020 00:26 )    Color: Red / Appearance: bloody / S.015 / pH: x  Gluc: x / Ketone: Trace  / Bili: Negative / Urobili: Negative   Blood: x / Protein: 500 mg/dL / Nitrite: Negative   Leuk Esterase: Trace / RBC: >50 /HPF / WBC 6-10 /HPF   Sq Epi: x / Non Sq Epi: Neg.-Few / Bacteria: TNTC /HPF      Procalcitonin, Serum: 0.59 ng/mL (20 @ 23:00)          CULTURES: (if applicable)    Culture - Urine (collected 20 @ 06:57)  Source: .Urine Clean Catch (Midstream)  Preliminary Report (20 @ 08:23):    >100,000 CFU/ml Gram Negative Rods    Culture - Blood (collected 20 @ 23:01)  Source: .Blood Blood-Peripheral  Preliminary Report (20 @ 11:01):    No growth to date.    Culture - Blood (collected 20 @ 23:00)  Source: .Blood Blood-Peripheral  Gram Stain (20 @ 21:19):    Growth in anaerobic bottle: Gram Negative Rods  Preliminary Report (20 @ 21:20):    Growth in anaerobic bottle: Gram Negative Rods    "Due to technical problems, Proteus sp. will Not be reported as part of    the BCID panel until further notice"    ***Blood Panel PCR results on this specimen are available    approximately 3 hours after the Gram stain result.***    Gram stain, PCR, and/or culture results may not always    correspond due to difference in methodologies.    ************************************************************    This PCR assay was performed using [employer].    The following targets are tested for: Enterococcus,    vancomycin resistant enterococci, Listeria monocytogenes,    coagulase negative staphylococci, S. aureus,    methicillin resistant S. aureus, Streptococcus agalactiae    (Group B), S. pneumoniae, S. pyogenes (Group A),    Acinetobacter baumannii, Enterobacter cloacae, E. coli,    Klebsiella oxytoca, K. pneumoniae, Proteus sp.,    Serratia marcescens, Haemophilus influenzae,    Neisseria meningitidis, Pseudomonas aeruginosa, Candida    albicans, C. glabrata, C krusei, C parapsilosis,    C. tropicalis and the KPC resistance gene.  Organism: Blood Culture PCR (20 @ 22:37)  Organism: Blood Culture PCR (20 @ 22:37)      -  Acinetobacter baumanii: Nondet      -  Candida albicans: Nondet      -  Candida glabrata: Nondet      -  Candida krusei: Nondet      -  Candida parapsilosis: Nondet      -  Candida tropicalis: Nondet      -  Coagulase negative Staphylococcus: Nondet      -  Enterobacter cloacae complex: Nondet      -  Enterococcus species: Nondet      -  Escherichia coli: Nondet      -  Haemophilus influenzae: Nondet      -  Klebsiella oxytoca: Nondet      -  Klebsiella pneumoniae: Nondet      -  Listeria monocytogenes: Nondet      -  Methicillin resistant Staphylococcus aureus (MRSA): Nondet      -  Multidrug (KPC pos) resistant organism: Nondet      -  Neisseria meningitidis: Nondet      -  Pseudomonas aeruginosa: Nondet      -  Serratia marcescens: Nondet      -  Staphylococcus aureus: Nondet      -  Streptococcus agalactiae (Group B): Nondet      -  Streptococcus pneumoniae: Nondet      -  Streptococcus pyogenes (Group A): Nondet      -  Streptococcus sp. (Not Grp A, B or S pneumoniae): Nondet      -  Vancomycin resistant Enterococcus sp.: Nondet      Method Type: PCR            CAPILLARY BLOOD GLUCOSE          RADIOLOGY  CXR:      CT:    ECHO:      VITALS:  T(C): 36.9 (20 @ 05:30), Max: 37.3 (20 @ 20:15)  T(F): 98.4 (20 @ 05:30), Max: 99.1 (20 @ 20:15)  HR: 96 (20 05:30) (84 - 108)  BP: 154/47 (20 @ 05:30) (136/69 - 174/66)  BP(mean): 94 (20 @ 18:00) (79 - 98)  ABP: --  ABP(mean): --  RR: 16 (20 @ 05:30) (16 - 26)  SpO2: 97% (20 05:30) (94% - 100%)  CVP(mm Hg): --  CVP(cm H2O): --    Ins and Outs     20 @ 07:01  -  20 @ 07:00  --------------------------------------------------------  IN: 6084 mL / OUT: 95399 mL / NET: -47030 mL    20 @ 07:01  -  20 @ 13:14  --------------------------------------------------------  IN: 240 mL / OUT: 0 mL / NET: 240 mL        Height (cm): 167.6 (20 @ 18:27)  Weight (kg): 55.1 (20 @ 02:15)  BMI (kg/m2): 19.6 (20 @ 18:27)        I&O's Detail    2020 07:  -  2020 07:00  --------------------------------------------------------  IN:    Continuous Bladder Irrigation: 5250 mL    Packed Red Blood Cells: 634 mL    Solution: 200 mL  Total IN: 6084 mL    OUT:    Continuous Bladder Irrigation: 19682 mL  Total OUT: 35199 mL    Total NET: -28486 mL      2020 07:  -  2020 13:14  --------------------------------------------------------  IN:    Oral Fluid: 240 mL  Total IN: 240 mL    OUT:  Total OUT: 0 mL    Total NET: 240 mL

## 2020-01-05 NOTE — CONSULT NOTE ADULT - SUBJECTIVE AND OBJECTIVE BOX
HPI:  89 y/o M PMH Afib BIBEMS from NH for evaluation of bleeding from penis, pt also has low H/H about hbg of 7 done today. Pt was noted to have bout of melena in ED as well as gross hematuria. Pt placed on CBI for irrigation.     Pt admitted to the ICU for coagulopathy with hematuria and melena.     Found to have positive urine culture - now on abx.     Still on slow CBI - moderately tinged urine. CT scan / sono c/w cystitis and likely clots in bladder.      PAST MEDICAL & SURGICAL HISTORY:  Insomnia  Hyperkalemia  CKD (chronic kidney disease)  BPH (benign prostatic hyperplasia)  Hyperlipidemia  Pulmonary embolism  Heart disease  HTN (hypertension)  Atrial fibrillation      REVIEW OF SYSTEMS:    CONSTITUTIONAL:  no fevers or chills  MUSCULOSKELETAL: No back pain  RESPIRATORY: No shortness of breath  CARDIOVASCULAR: No chest pain  GASTROINTESTINAL: No abdominal or epigastric pain.  NEUROLOGICAL: No mental status changes  PSYCH: No depression, no mood changes      MEDICATIONS  (STANDING):  amLODIPine   Tablet 10 milliGRAM(s) Oral daily  cefTRIAXone   IVPB 1000 milliGRAM(s) IV Intermittent every 24 hours  escitalopram 5 milliGRAM(s) Oral daily  lisinopril 10 milliGRAM(s) Oral daily  pantoprazole  Injectable 40 milliGRAM(s) IV Push two times a day      Allergies    aspirin (Unknown)  penicillin (Unknown)    SOCIAL HISTORY: No illicit drug use    FAMILY HISTORY:      Vital Signs Last 24 Hrs  T(C): 36.9 (2020 05:30), Max: 37.3 (2020 20:15)  T(F): 98.4 (2020 05:30), Max: 99.1 (2020 20:15)  HR: 96 (2020 05:30) (84 - 108)  BP: 154/47    PHYSICAL EXAM:    Constitutional: NAD  Respiratory: No accessory respiratory muscle use  Abd: Soft, NT/ND  : Glaser moderately blood tinged  Psychiatric: Normal mood, normal affect      LABS:                        8.8    6.46  )-----------( 118      ( 2020 06:00 )             27.0     01-05    149<H>  |  117<H>  |  66<H>  ----------------------------<  85  4.4   |  19<L>  |  1.98<H>    Ca    8.6      2020 06:00  Phos  4.4       Mg     2.5         TPro  6.9  /  Alb  2.9<L>  /  TBili  0.5  /  DBili  x   /  AST  24  /  ALT  24  /  AlkPhos  66      PT/INR - ( 2020 04:30 )   PT: 18.4 sec;   INR: 1.62 ratio         PTT - ( 2020 23:00 )  PTT:35.4 sec  Urinalysis Basic - ( 2020 00:26 )    Color: Red / Appearance: bloody / S.015 / pH: x  Gluc: x / Ketone: Trace  / Bili: Negative / Urobili: Negative   Blood: x / Protein: 500 mg/dL / Nitrite: Negative   Leuk Esterase: Trace / RBC: >50 /HPF / WBC 6-10 /HPF   Sq Epi: x / Non Sq Epi: Neg.-Few / Bacteria: TNTC /HPF        Culture - Urine (collected 20 @ 06:57)  Source: .Urine Clean Catch (Midstream)  Preliminary Report (20 @ 08:23):    >100,000 CFU/ml Gram Negative Rods    Culture - Blood (collected 20 @ 23:01)  Source: .Blood Blood-Peripheral  Preliminary Report (20 @ 11:01):    No growth to date.    Culture - Blood (collected 20 @ 23:00)  Source: .Blood Blood-Peripheral  Gram Stain (20 @ 21:19):    Growth in anaerobic bottle: Gram Negative Rods  Preliminary Report (20 @ 21:20):    Growth in anaerobic bottle: Gram Negative Rods

## 2020-01-05 NOTE — PROGRESS NOTE ADULT - SUBJECTIVE AND OBJECTIVE BOX
Patient is a 89y old  Male who presents with a chief complaint of     Patient seen and examined at bedside. No overnight events reported. No c/o pain     ALLERGIES:  aspirin (Unknown)  penicillin (Unknown)    MEDICATIONS  (STANDING):  cefTRIAXone   IVPB 1000 milliGRAM(s) IV Intermittent every 24 hours  escitalopram 5 milliGRAM(s) Oral daily  pantoprazole  Injectable 40 milliGRAM(s) IV Push two times a day  piperacillin/tazobactam IVPB.. 3.375 Gram(s) IV Intermittent every 8 hours    Vital Signs Last 24 Hrs  T(F): 98.4 (2020 05:30), Max: 99.1 (2020 20:15)  HR: 96 (2020 05:30) (83 - 108)  BP: 154/47 (2020 05:30) (124/38 - 176/55)  RR: 16 (2020 05:30) (16 - 41)  SpO2: 97% (2020 05:30) (94% - 100%)    I&O's Summary  2020 07:  -  2020 07:00  --------------------------------------------------------  IN: 6084 mL / OUT: 17311 mL / NET: -13473 mL    2020 07:01  -  2020 10:19  --------------------------------------------------------  IN: 240 mL / OUT: 0 mL / NET: 240 mL    PHYSICAL EXAM:  GENERAL: NAD, smiling  HEAD:  Atraumatic, Normocephalic  EYES: EOMI, conjunctiva and sclera clear  ENMT: Moist mucous membranes  NECK: Supple  CHEST/LUNG: Clear to auscultation bilaterally, good air entry, non-labored breathing  HEART: +S1/S2  ABDOMEN: Soft, Nontender, Nondistended; Bowel sounds present  : Glaser with CBI   VASCULAR: Normal pulses, Normal capillary refill, no edema  EXTREMITIES: No calf tenderness, No cyanosis  SKIN: Warm, Intact  NERVOUS SYSTEM:  Alert, No focal deficits    LABS:                        8.8    6.46  )-----------( 118      ( 2020 06:00 )             27.0         149  |  117  |  66  ----------------------------<  85  4.4   |  19  |  1.98    Ca    8.6      2020 06:00  Phos  4.4       Mg     2.5         TPro  6.9  /  Alb  2.9  /  TBili  0.5  /  DBili  x   /  AST  24  /  ALT  24  /  AlkPhos  66      eGFR if Non African American: 28 mL/min/1.73M2 (20 @ 06:00)  eGFR if African American: 33 mL/min/1.73M2 (20 @ 06:00)    PT/INR - ( 2020 04:30 )   PT: 18.4 sec;   INR: 1.62 ratio         PTT - ( 2020 23:00 )  PTT:35.4 sec  Lactate, Blood: 1.3 mmol/L ( @ 23:00)      Urinalysis Basic - ( 2020 00:26 )    Color: Red / Appearance: bloody / S.015 / pH: x  Gluc: x / Ketone: Trace  / Bili: Negative / Urobili: Negative   Blood: x / Protein: 500 mg/dL / Nitrite: Negative   Leuk Esterase: Trace / RBC: >50 /HPF / WBC 6-10 /HPF   Sq Epi: x / Non Sq Epi: Neg.-Few / Bacteria: TNTC /HPF    Culture - Urine (collected 2020 06:57)  Source: .Urine Clean Catch (Midstream)  Preliminary Report (2020 08:23):    >100,000 CFU/ml Gram Negative Rods    Culture - Blood (collected 2020 23:00)  Source: .Blood Blood-Peripheral  Gram Stain (2020 21:19):    Growth in anaerobic bottle: Gram Negative Rods  Preliminary Report (2020 21:20):    Growth in anaerobic bottle: Gram Negative Rods    "Due to technical problems, Proteus sp. will Not be reported as part of    the BCID panel until further notice"    ***Blood Panel PCR results on this specimen are available    approximately 3 hours after the Gram stain result.***    Gram stain, PCR, and/or culture results may not always    correspond due to difference in methodologies.    ************************************************************    This PCR assay was performed using [employer].    The following targets are tested for: Enterococcus,    vancomycin resistant enterococci, Listeria monocytogenes,    coagulase negative staphylococci, S. aureus,    methicillin resistant S. aureus, Streptococcus agalactiae    (Group B), S. pneumoniae, S. pyogenes (Group A),    Acinetobacter baumannii, Enterobacter cloacae, E. coli,    Klebsiella oxytoca, K. pneumoniae, Proteus sp.,    Serratia marcescens, Haemophilus influenzae,    Neisseria meningitidis, Pseudomonas aeruginosa, Candida    albicans, C. glabrata, C krusei, C parapsilosis,    C. tropicalis and the KPC resistance gene.  Organism: Blood Culture PCR (2020 22:37)  Organism: Blood Culture PCR (2020 22:37)      -  Acinetobacter baumanii: Nondet      -  Candida albicans: Nondet      -  Candida glabrata: Nondet      -  Candida krusei: Nondet      -  Candida parapsilosis: Nondet      -  Candida tropicalis: Nondet      -  Coagulase negative Staphylococcus: Nondet      -  Enterobacter cloacae complex: Nondet      -  Enterococcus species: Nondet      -  Escherichia coli: Nondet      -  Haemophilus influenzae: Nondet      -  Klebsiella oxytoca: Nondet      -  Klebsiella pneumoniae: Nondet      -  Listeria monocytogenes: Nondet      -  Methicillin resistant Staphylococcus aureus (MRSA): Nondet      -  Multidrug (KPC pos) resistant organism: Nondet      -  Neisseria meningitidis: Nondet      -  Pseudomonas aeruginosa: Nondet      -  Serratia marcescens: Nondet      -  Staphylococcus aureus: Nondet      -  Streptococcus agalactiae (Group B): Nondet      -  Streptococcus pneumoniae: Nondet      -  Streptococcus pyogenes (Group A): Nondet      -  Streptococcus sp. (Not Grp A, B or S pneumoniae): Nondet      -  Vancomycin resistant Enterococcus sp.: Nondet      Method Type: PCR        RADIOLOGY & ADDITIONAL TESTS:    Care Discussed with Consultants/Other Providers: yes

## 2020-01-06 LAB
-  AMIKACIN: SIGNIFICANT CHANGE UP
-  AMIKACIN: SIGNIFICANT CHANGE UP
-  AMPICILLIN/SULBACTAM: SIGNIFICANT CHANGE UP
-  AMPICILLIN/SULBACTAM: SIGNIFICANT CHANGE UP
-  AMPICILLIN: SIGNIFICANT CHANGE UP
-  AMPICILLIN: SIGNIFICANT CHANGE UP
-  AZTREONAM: SIGNIFICANT CHANGE UP
-  AZTREONAM: SIGNIFICANT CHANGE UP
-  CEFAZOLIN: SIGNIFICANT CHANGE UP
-  CEFAZOLIN: SIGNIFICANT CHANGE UP
-  CEFEPIME: SIGNIFICANT CHANGE UP
-  CEFEPIME: SIGNIFICANT CHANGE UP
-  CEFOXITIN: SIGNIFICANT CHANGE UP
-  CEFOXITIN: SIGNIFICANT CHANGE UP
-  CEFTRIAXONE: SIGNIFICANT CHANGE UP
-  CEFTRIAXONE: SIGNIFICANT CHANGE UP
-  CIPROFLOXACIN: SIGNIFICANT CHANGE UP
-  CIPROFLOXACIN: SIGNIFICANT CHANGE UP
-  ERTAPENEM: SIGNIFICANT CHANGE UP
-  GENTAMICIN: SIGNIFICANT CHANGE UP
-  GENTAMICIN: SIGNIFICANT CHANGE UP
-  LEVOFLOXACIN: SIGNIFICANT CHANGE UP
-  LEVOFLOXACIN: SIGNIFICANT CHANGE UP
-  MEROPENEM: SIGNIFICANT CHANGE UP
-  MEROPENEM: SIGNIFICANT CHANGE UP
-  NITROFURANTOIN: SIGNIFICANT CHANGE UP
-  PIPERACILLIN/TAZOBACTAM: SIGNIFICANT CHANGE UP
-  PIPERACILLIN/TAZOBACTAM: SIGNIFICANT CHANGE UP
-  TOBRAMYCIN: SIGNIFICANT CHANGE UP
-  TOBRAMYCIN: SIGNIFICANT CHANGE UP
-  TRIMETHOPRIM/SULFAMETHOXAZOLE: SIGNIFICANT CHANGE UP
-  TRIMETHOPRIM/SULFAMETHOXAZOLE: SIGNIFICANT CHANGE UP
ANION GAP SERPL CALC-SCNC: 10 MMOL/L — SIGNIFICANT CHANGE UP (ref 5–17)
BUN SERPL-MCNC: 54 MG/DL — HIGH (ref 7–23)
CALCIUM SERPL-MCNC: 8.3 MG/DL — LOW (ref 8.4–10.5)
CHLORIDE SERPL-SCNC: 114 MMOL/L — HIGH (ref 96–108)
CO2 SERPL-SCNC: 22 MMOL/L — SIGNIFICANT CHANGE UP (ref 22–31)
CREAT SERPL-MCNC: 1.85 MG/DL — HIGH (ref 0.5–1.3)
CULTURE RESULTS: SIGNIFICANT CHANGE UP
CULTURE RESULTS: SIGNIFICANT CHANGE UP
GLUCOSE SERPL-MCNC: 103 MG/DL — HIGH (ref 70–99)
HCT VFR BLD CALC: 27.2 % — LOW (ref 39–50)
HGB BLD-MCNC: 8.7 G/DL — LOW (ref 13–17)
INR BLD: 1.16 RATIO — SIGNIFICANT CHANGE UP (ref 0.88–1.16)
MCHC RBC-ENTMCNC: 32 GM/DL — SIGNIFICANT CHANGE UP (ref 32–36)
MCHC RBC-ENTMCNC: 32 PG — SIGNIFICANT CHANGE UP (ref 27–34)
MCV RBC AUTO: 100 FL — SIGNIFICANT CHANGE UP (ref 80–100)
METHOD TYPE: SIGNIFICANT CHANGE UP
METHOD TYPE: SIGNIFICANT CHANGE UP
NRBC # BLD: 0 /100 WBCS — SIGNIFICANT CHANGE UP (ref 0–0)
ORGANISM # SPEC MICROSCOPIC CNT: SIGNIFICANT CHANGE UP
PLATELET # BLD AUTO: 121 K/UL — LOW (ref 150–400)
POTASSIUM SERPL-MCNC: 4.9 MMOL/L — SIGNIFICANT CHANGE UP (ref 3.5–5.3)
POTASSIUM SERPL-SCNC: 4.9 MMOL/L — SIGNIFICANT CHANGE UP (ref 3.5–5.3)
PROTHROM AB SERPL-ACNC: 13.1 SEC — HIGH (ref 10–12.9)
RBC # BLD: 2.72 M/UL — LOW (ref 4.2–5.8)
RBC # FLD: 14.7 % — HIGH (ref 10.3–14.5)
SODIUM SERPL-SCNC: 146 MMOL/L — HIGH (ref 135–145)
SPECIMEN SOURCE: SIGNIFICANT CHANGE UP
SPECIMEN SOURCE: SIGNIFICANT CHANGE UP
WBC # BLD: 5.38 K/UL — SIGNIFICANT CHANGE UP (ref 3.8–10.5)
WBC # FLD AUTO: 5.38 K/UL — SIGNIFICANT CHANGE UP (ref 3.8–10.5)

## 2020-01-06 PROCEDURE — 99233 SBSQ HOSP IP/OBS HIGH 50: CPT | Mod: GC

## 2020-01-06 RX ORDER — SENNA PLUS 8.6 MG/1
2 TABLET ORAL AT BEDTIME
Refills: 0 | Status: DISCONTINUED | OUTPATIENT
Start: 2020-01-06 | End: 2020-01-09

## 2020-01-06 RX ORDER — POLYETHYLENE GLYCOL 3350 17 G/17G
17 POWDER, FOR SOLUTION ORAL DAILY
Refills: 0 | Status: DISCONTINUED | OUTPATIENT
Start: 2020-01-06 | End: 2020-01-09

## 2020-01-06 RX ADMIN — SENNA PLUS 2 TABLET(S): 8.6 TABLET ORAL at 22:00

## 2020-01-06 RX ADMIN — AMLODIPINE BESYLATE 10 MILLIGRAM(S): 2.5 TABLET ORAL at 05:06

## 2020-01-06 RX ADMIN — PANTOPRAZOLE SODIUM 40 MILLIGRAM(S): 20 TABLET, DELAYED RELEASE ORAL at 17:03

## 2020-01-06 RX ADMIN — CEFTRIAXONE 100 MILLIGRAM(S): 500 INJECTION, POWDER, FOR SOLUTION INTRAMUSCULAR; INTRAVENOUS at 16:33

## 2020-01-06 RX ADMIN — ESCITALOPRAM OXALATE 5 MILLIGRAM(S): 10 TABLET, FILM COATED ORAL at 11:24

## 2020-01-06 RX ADMIN — LISINOPRIL 10 MILLIGRAM(S): 2.5 TABLET ORAL at 05:06

## 2020-01-06 RX ADMIN — PANTOPRAZOLE SODIUM 40 MILLIGRAM(S): 20 TABLET, DELAYED RELEASE ORAL at 05:06

## 2020-01-06 NOTE — PROGRESS NOTE ADULT - SUBJECTIVE AND OBJECTIVE BOX
Patient is a 89y old  Male who presents with a chief complaint of hematuria (2020 09:53)    Patient seen and examined at bedside. Patient upset he is in the hospital but otherwise feels OK    ALLERGIES:  aspirin (Unknown)  penicillin (Unknown)    MEDICATIONS  (STANDING):  amLODIPine   Tablet 10 milliGRAM(s) Oral daily  cefTRIAXone   IVPB 1000 milliGRAM(s) IV Intermittent every 24 hours  escitalopram 5 milliGRAM(s) Oral daily  lisinopril 10 milliGRAM(s) Oral daily  pantoprazole  Injectable 40 milliGRAM(s) IV Push two times a day    MEDICATIONS  (PRN):    Vital Signs Last 24 Hrs  T(F): 98.1 (2020 10:13), Max: 98.5 (2020 15:59)  HR: 81 (2020 10:13) (81 - 101)  BP: 161/51 (2020 10:13) (146/68 - 170/56)  RR: 16 (2020 10:13) (16 - 16)  SpO2: 94% (2020 10:13) (94% - 96%)    I&O's Summary  2020 07:01  -  2020 07:00  --------------------------------------------------------  IN: 3160 mL / OUT: 9100 mL / NET: -5940 mL    2020 07:01  -  2020 10:32  --------------------------------------------------------  IN: 100 mL / OUT: 0 mL / NET: 100 mL    PHYSICAL EXAM:  GENERAL: NAD  HEAD:  Atraumatic, Normocephalic  EYES: EOMI, conjunctiva and sclera clear  ENMT: Moist mucous membranes  NECK: Supple  CHEST/LUNG: Clear to auscultation bilaterally, good air entry, non-labored breathing  HEART: +S1/S2  ABDOMEN: Soft, Nontender, Nondistended; Bowel sounds present  : Glaser with CBI -- urine now clear yellow  VASCULAR: Normal pulses, Normal capillary refill, no edema  EXTREMITIES: No calf tenderness, No cyanosis  SKIN: Warm, Intact  NERVOUS SYSTEM:  Alert, No focal deficits    LABS:                        8.7    5.38  )-----------( 121      ( 2020 06:45 )             27.2         146  |  114  |  54  ----------------------------<  103  4.9   |  22  |  1.85    Ca    8.3      2020 06:45  Phos  4.4       Mg     2.5         TPro  6.9  /  Alb  2.9  /  TBili  0.5  /  DBili  x   /  AST  24  /  ALT  24  /  AlkPhos  66      eGFR if Non African American: 30 mL/min/1.73M2 (20 @ 06:45)  eGFR if African American: 35 mL/min/1.73M2 (20 @ 06:45)    PT/INR - ( 2020 06:45 )   PT: 13.1 sec;   INR: 1.16 ratio         PTT - ( 2020 23:00 )  PTT:35.4 sec  Lactate, Blood: 1.3 mmol/L ( @ 23:00)    Urinalysis Basic - ( 2020 00:26 )    Color: Red / Appearance: bloody / S.015 / pH: x  Gluc: x / Ketone: Trace  / Bili: Negative / Urobili: Negative   Blood: x / Protein: 500 mg/dL / Nitrite: Negative   Leuk Esterase: Trace / RBC: >50 /HPF / WBC 6-10 /HPF   Sq Epi: x / Non Sq Epi: Neg.-Few / Bacteria: TNTC /HPF      Culture - Urine (collected 2020 06:57)  Source: .Urine Clean Catch (Midstream)  Preliminary Report (2020 08:23):    >100,000 CFU/ml Gram Negative Rods    Culture - Blood (collected 2020 23:01)  Source: .Blood Blood-Peripheral  Preliminary Report (2020 11:01):    No growth to date.    Culture - Blood (collected 2020 23:00)  Source: .Blood Blood-Peripheral  Gram Stain (2020 21:19):    Growth in anaerobic bottle: Gram Negative Rods  Preliminary Report (2020 21:20):    Growth in anaerobic bottle: Gram Negative Rods    "Due to technical problems, Proteus sp. will Not be reported as part of    the BCID panel until further notice"    ***Blood Panel PCR results on this specimen are available    approximately 3 hours after the Gram stain result.***    Gram stain, PCR, and/or culture results may not always    correspond due to difference in methodologies.    ************************************************************    This PCR assay was performed using [employer].    The following targets are tested for: Enterococcus,    vancomycin resistant enterococci, Listeria monocytogenes,    coagulase negative staphylococci, S. aureus,    methicillin resistant S. aureus, Streptococcus agalactiae    (Group B), S. pneumoniae, S. pyogenes (Group A),    Acinetobacter baumannii, Enterobacter cloacae, E. coli,    Klebsiella oxytoca, K. pneumoniae, Proteus sp.,    Serratia marcescens, Haemophilus influenzae,    Neisseria meningitidis, Pseudomonas aeruginosa, Candida    albicans, C. glabrata, C krusei, C parapsilosis,    C. tropicalis and the KPC resistance gene.  Organism: Blood Culture PCR (2020 22:37)  Organism: Blood Culture PCR (2020 22:37)      -  Acinetobacter baumanii: Nondet      -  Candida albicans: Nondet      -  Candida glabrata: Nondet      -  Candida krusei: Nondet      -  Candida parapsilosis: Nondet      -  Candida tropicalis: Nondet      -  Coagulase negative Staphylococcus: Nondet      -  Enterobacter cloacae complex: Nondet      -  Enterococcus species: Nondet      -  Escherichia coli: Nondet      -  Haemophilus influenzae: Nondet      -  Klebsiella oxytoca: Nondet      -  Klebsiella pneumoniae: Nondet      -  Listeria monocytogenes: Nondet      -  Methicillin resistant Staphylococcus aureus (MRSA): Nondet      -  Multidrug (KPC pos) resistant organism: Nondet      -  Neisseria meningitidis: Nondet      -  Pseudomonas aeruginosa: Nondet      -  Serratia marcescens: Nondet      -  Staphylococcus aureus: Nondet      -  Streptococcus agalactiae (Group B): Nondet      -  Streptococcus pneumoniae: Nondet      -  Streptococcus pyogenes (Group A): Nondet      -  Streptococcus sp. (Not Grp A, B or S pneumoniae): Nondet      -  Vancomycin resistant Enterococcus sp.: Nondet      Method Type: PCR        RADIOLOGY & ADDITIONAL TESTS:    Care Discussed with Consultants/Other Providers: yes

## 2020-01-06 NOTE — CONSULT NOTE ADULT - SUBJECTIVE AND OBJECTIVE BOX
HPI:   Patient is a 89y male who came to the hospital for hematuria. He had a cbi catheter placed and the hematuria has cleared. He grew proteus from the blood hence we are called. The patient reports no dysuria or hesitancy before this happened. He is feeling fine now. He was noted to have some gi bleed upon admit as well.     REVIEW OF SYSTEMS:  All other review of systems negative (Comprehensive ROS)    PAST MEDICAL & SURGICAL HISTORY:  Insomnia  Hyperkalemia  CKD (chronic kidney disease)  BPH (benign prostatic hyperplasia)  Hyperlipidemia  Pulmonary embolism  Heart disease  HTN (hypertension)  Atrial fibrillation      Allergies    aspirin (Unknown)  penicillin (Unknown)    Intolerances        Antimicrobials Day #  :3  cefTRIAXone   IVPB 1000 milliGRAM(s) IV Intermittent every 24 hours    Other Medications:  amLODIPine   Tablet 10 milliGRAM(s) Oral daily  escitalopram 5 milliGRAM(s) Oral daily  lisinopril 10 milliGRAM(s) Oral daily  pantoprazole  Injectable 40 milliGRAM(s) IV Push two times a day      FAMILY HISTORY:      SOCIAL HISTORY:  Smoking: [ ]Yes x[ ]No  ETOH: [ ]Yes [ ]xNo  Drug Use: [ ]Yes [ x]No   [x ] Single[ ]    T(F): 98.1 (01-06-20 @ 10:13), Max: 98.1 (01-06-20 @ 10:13)  HR: 81 (01-06-20 @ 10:13)  BP: 161/51 (01-06-20 @ 10:13)  RR: 16 (01-06-20 @ 10:13)  SpO2: 94% (01-06-20 @ 10:13)  Wt(kg): --    PHYSICAL EXAM:  General: alert, no acute distress  Eyes:  anicteric, no conjunctival injection, no discharge  Oropharynx: no lesions or injection 	  Neck: supple, without adenopathy  Lungs: clear to auscultation  Heart: regular rate and rhythm; no murmur, rubs or gallops  Abdomen: soft, nondistended, nontender, without mass or organomegaly  Skin: no lesions  Extremities: no clubbing, cyanosis, or edema  Neurologic: alert, , moves all extremities  scrotum no swelling  LAB RESULTS:                        8.7    5.38  )-----------( 121      ( 06 Jan 2020 06:45 )             27.2     01-06    146<H>  |  114<H>  |  54<H>  ----------------------------<  103<H>  4.9   |  22  |  1.85<H>    Ca    8.3<L>      06 Jan 2020 06:45  Phos  4.4     01-05  Mg     2.5     01-05            MICROBIOLOGY:  RECENT CULTURES:  01-04 @ 06:57 .Urine Clean Catch (Midstream) Proteus mirabilis    >100,000 CFU/ml Proteus mirabilis      01-03 @ 23:01 .Blood Blood-Peripheral     No growth to date.      01-03 @ 23:00 .Blood Blood-Peripheral Blood Culture PCR  Proteus mirabilis    Growth in anaerobic bottle: Proteus mirabilis  "Due to technical problems, Proteus sp. will Not be reported as part of  the BCID panel until further notice"  ***Blood Panel PCR results on this specimen are available  approximately 3 hours after theGram stain result.***  Gram stain, PCR, and/or culture results may not always  correspond due to difference in methodologies.  ************************************************************  This PCR assay was performed using [employer].  The following targets are tested for: Enterococcus,  vancomycin resistant enterococci, Listeria monocytogenes,  coagulase negative staphylococci, S. aureus,  methicillin resistant S. aureus, Streptococcus agalactiae  (Group B), S. pneumoniae, S. pyogenes (Group A),  Acinetobacter baumannii, Enterobacter cloacae, E. coli,  Klebsiella oxytoca, K. pneumoniae, Proteus sp.,  Serratia marcescens, Haemophilus influenzae,  Neisseria meningitidis, Pseudomonas aeruginosa, Candida  albicans, C. glabrata, C krusei,C parapsilosis,  C. tropicalis and the KPC resistance gene.    Growth in anaerobic bottle: Gram Negative Rods          RADIOLOGY REVIEWED:  < from: CT Abdomen and Pelvis No Cont (01.04.20 @ 02:23) >    EXAM:  CT ABDOMEN AND PELVIS      PROCEDURE DATE:  01/04/2020        INTERPRETATION:  CLINICAL INFORMATION: Abdominal pain. Blood in urine.    COMPARISON: None.    PROCEDURE:   CT of the Abdomen and Pelvis was performed without intravenous contrast.   Intravenous contrast: None.  Oral contrast: None.  Sagittal and coronal reformats were performed.    FINDINGS:    LOWER CHEST: Small left pleural effusion with mural thickening probable rounded atelectasis in the left lower lobe. Left pleural calcification. Cardiomegaly.     LIVER: Within normal limits.  BILE DUCTS: Normal caliber.   GALLBLADDER: Within normal limits.  SPLEEN: Within normal limits.  PANCREAS: Within normal limits.  ADRENALS: Within normal limits.  KIDNEYS/URETERS: Mild bilateral hydroureteronephrosis to the level of the urinary bladder.     BLADDER: Mural thickening of the urinary bladder with surrounding inflammatory change. Foci of air within the bladder, which contains a Batista catheter balloon.  REPRODUCTIVE ORGANS: Theprostate is within normal limits.    BOWEL: No bowel obstruction. Normal appendix. Large amount of stool within the rectum.  PERITONEUM: No ascites.  VESSELS:  Atherosclerotic change of the abdominal aorta and its branches.  RETROPERITONEUM: No lymphadenopathy.    ABDOMINAL WALL: Within normal limits.  BONES: Degenerative changes of the spine. Chronic ununited right femoral neck fracture.    IMPRESSION:   Cystitis.    Mild bilateral hydroureteronephrosis to the level of the urinary bladder.    Large amount of stool within the rectum.        < end of copied text >    < from: US Kidney and Bladder (01.04.20 @ 11:04) >    EXAM:  US KIDNEYS AND BLADDER      PROCEDURE DATE:  01/04/2020        INTERPRETATION:  US KIDNEYS AND BLADDER       HISTORY:  Hematuria    COMPARISON: CT abdomen and pelvis from the same day    PROCEDURE/TECHNIQUE: Multiple transverse and longitudinal sonographic images of the bilateral kidneys, retroperitoneum and urinary bladder were obtained.       KIDNEYS:  Atrophic and echogenic without hydronephrosis bilaterally.    URINARY BLADDER: Collapsed around a Batista catheter balloon limiting evaluation.    There is debris versus blood clot surrounding the Batista catheter balloon.    The aorta and inferior vena cava are obscured by overlying bowel gas.    IMPRESSION:    Chronic bilateral medical renal disease without evidence for postrenal obstruction.       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 He also has a huge fecal impaction . Suspect some component of retention given hydro seen on ct maybe related to the pressure of the fecal impaction    Recommendations:  continue ceftriaxone  repeat blood cultures  batista per gu  bowel regimen  will need to check pvr once batista is removed  eventual po antibiotics possible

## 2020-01-06 NOTE — PROGRESS NOTE ADULT - ASSESSMENT
89 y/o M PMH Afib, HTN brought from NH for evaluation of bleeding from penis, pt also had low H/H about hbg of 7. Pt was noted to have bout of melena in ED as well as gross hematuria. Pt placed on CBI for irrigation. Pt had elevated INR 1.7, but denied taking AC any time recently, states was on coumadin, but was stopped many years ago. Pt admitted to the ICU for ABLA and coagulopathy with hematuria and melena. Transferred to tele 1/4.     Acute blood loss anemia, secondary to hematuria and GI bleed  - Improved s/p 2 units PRBCs on 1/4  - H&H stabilized  - Continue CBI for hematuria -- hematuria is improving, will continue   - Appreciate GI and urology recs     Coagulopathy   - s/p FFP, vitamin K on 1/4  - INR now 1.16    Bacteremia  - blood culture with GNR  - Continue ceftriaxone for now, f/u final blood culture sensitivities  - Afebrile, WBCs normal    HTN  - At home on lisinopril 10 and norvasc 10  - Ordered yesterday but meds restarted today     Afib  - not on AC  - Hx of bradycardia  - rate is controlled, HR 81    Hx of bradycardia   - monitor tele     Chronic kidney disease stage 4  - Cr improving, unknown baseline Cr  - Monitor    Depression  - lexapro    DVT ppx  - scds 89 y/o M PMH Afib, HTN brought from NH for evaluation of bleeding from penis, pt also had low H/H about hbg of 7. Pt was noted to have bout of melena in ED as well as gross hematuria. Pt placed on CBI for irrigation. Pt had elevated INR 1.7, but denied taking AC any time recently, states was on coumadin, but was stopped many years ago. Pt admitted to the ICU for ABLA and coagulopathy with hematuria and melena. Transferred to tele 1/4.     Acute blood loss anemia, secondary to hematuria and GI bleed  - Improved s/p 2 units PRBCs on 1/4  - H&H stabilized  - Continue CBI for hematuria -- hematuria is improving, will continue for now  - Appreciate GI and urology recs     Coagulopathy   - s/p FFP, vitamin K on 1/4  - INR now 1.16  - hematology?     Bacteremia  - blood culture with GNR  - Continue ceftriaxone for now, f/u final blood culture sensitivities  - Afebrile, WBCs normal    HTN  - At home on lisinopril 10 and norvasc 10  - Ordered yesterday but meds restarted today     Afib  - not on AC  - Hx of bradycardia  - rate is controlled, HR 81    Hx of bradycardia   - monitor tele     Chronic kidney disease stage 4  - Cr improving, unknown baseline Cr  - Monitor    Depression  - lexapro    DVT ppx  - scds 89 y/o M PMH Afib, HTN brought from NH for evaluation of bleeding from penis, pt also had low H/H about hbg of 7. Pt was noted to have bout of melena in ED as well as gross hematuria. Pt placed on CBI for irrigation. Pt had elevated INR 1.7, but denied taking AC any time recently, states was on coumadin, but was stopped many years ago. Pt admitted to the ICU for ABLA and coagulopathy with hematuria and melena. Transferred to tele 1/4.     Acute blood loss anemia, secondary to hematuria and GI bleed  - Improved s/p 2 units PRBCs on 1/4  - H&H stabilized  - Continue CBI for hematuria -- hematuria is improving, will continue for now  - Appreciate GI and urology recs     Coagulopathy   - s/p FFP, vitamin K on 1/4  - INR now 1.16    Bacteremia  - blood culture with GNR  - Continue ceftriaxone for now, f/u final blood culture sensitivities  - Afebrile, WBCs normal    HTN  - At home on lisinopril 10 and norvasc 10  - Ordered yesterday but meds restarted today     Afib  - not on AC  - Hx of bradycardia  - rate is controlled, HR 81    Hx of bradycardia   - monitor tele     Chronic kidney disease stage 4  - Cr improving, unknown baseline Cr  - Monitor    Depression  - lexapro    DVT ppx  - scds

## 2020-01-06 NOTE — GOALS OF CARE CONVERSATION - ADVANCED CARE PLANNING - CONVERSATION DETAILS
Met with patient at bedside. He is A&Ox3. States his god son John is his health agent. Discussed GOC. He stated that he does not want attempted resuscitation or intubation in the event of cardiac arrest or cardiopulmonary decompensation. Reviewed MOLST form with him.  He does want to be sent to hospital and be treated for an acute condition with IV fluids and abx. Does not want feeding tube if he is ever unable to safely eat by mouth.

## 2020-01-06 NOTE — PROGRESS NOTE ADULT - ATTENDING COMMENTS
I have personally seen and examined patient on the above date.  I discussed the case with Pat Appiah and I agree with findings and plan as detailed per note above, which I have amended where appropriate.
hemorrhagic and emphysematous cystitis with bacteremia   - urine cx: proteus   - blood cx: gram neg saad  - per ID: cont IV rocephin    acute blood loss anemia 2/2 melena and hematuria  - no new episodes of GI bleeds  - hematuria resolved  - currently still on CBI. management as per urology  - H/H stable    melena. possible upper GI bleeds  - IV protonix q12  - per GI, possible EGD    Stool impaction in rectum  - bowel regimen (senna, miralax prn)  -+ BM today per flowsheet

## 2020-01-06 NOTE — CONSULT NOTE ADULT - SUBJECTIVE AND OBJECTIVE BOX
Patient seen and examined.  Full consult dictated and will be available shortly.    Elderly male admitted from NH with hematuria and placed on CBI.  He was also noted to have melena.  Currently not on anticoagulants (history of Afib) or antiplatelet agents.  No history of peptic ulcer disease.  He denies abdominal pain, nausea or vomiting.  Regular diet was started on patient.  No dysphagia or odynophagia.  VSS.  Abdomen soft, nontender BS+  CT Abdomen/pelvis reviewed - cystitis, ++stool in rectum    Impression: Melena    Plan:  1. Start proton pump inhibitor  2. Monitor Hgb/Hct and bowel movements  3. Possible EGD tomorrow   4. Diet as tolerated for now

## 2020-01-06 NOTE — PROGRESS NOTE ADULT - SUBJECTIVE AND OBJECTIVE BOX
Follow-up Critical Care Progress Note  Chief Complaint : Melena    pt feeling better   no cp  sob, palp, nv    CBI with clear urine    Allergies :aspirin (Unknown)  penicillin (Unknown)      PAST MEDICAL & SURGICAL HISTORY:  Insomnia  Hyperkalemia  CKD (chronic kidney disease)  BPH (benign prostatic hyperplasia)  Hyperlipidemia  Pulmonary embolism  Heart disease  HTN (hypertension)  Atrial fibrillation      Medications:  MEDICATIONS  (STANDING):  amLODIPine   Tablet 10 milliGRAM(s) Oral daily  cefTRIAXone   IVPB 1000 milliGRAM(s) IV Intermittent every 24 hours  escitalopram 5 milliGRAM(s) Oral daily  lisinopril 10 milliGRAM(s) Oral daily  pantoprazole  Injectable 40 milliGRAM(s) IV Push two times a day    MEDICATIONS  (PRN):      LABS:                        8.7    5.38  )-----------( 121      ( 06 Jan 2020 06:45 )             27.2     01-06    146<H>  |  114<H>  |  54<H>  ----------------------------<  103<H>  4.9   |  22  |  1.85<H>    Ca    8.3<L>      06 Jan 2020 06:45  Phos  4.4     01-05  Mg     2.5     01-05        WBC Trend  01-06-20 @ 06:45   -  5.38  01-05-20 @ 14:10   -  5.59  01-05-20 @ 06:00   -  6.46  01-04-20 @ 14:00   -  7.72  01-04-20 @ 04:30   -  4.31  01-03-20 @ 23:00   -  6.72    H/H Trend  01-06-20 @ 06:45   -  8.7<L> / 27.2<L>  01-05-20 @ 14:10   -  8.7<L> / 26.6<L>  01-05-20 @ 06:00   -  8.8<L> / 27.0<L>  01-04-20 @ 14:00   -  9.2<L> / 27.7<L>  01-04-20 @ 04:30   -  6.6<LL> / 20.6<LL>  01-03-20 @ 23:00   -  8.2<L> / 25.3<L>    Platelet Trend  01-06-20 @ 06:45   -  121<L>  01-05-20 @ 14:10   -  118<L>  01-05-20 @ 06:00   -  118<L>  01-04-20 @ 14:00   -  119<L>  01-04-20 @ 04:30   -  113<L>  01-03-20 @ 23:00   -  136<L>    Trend Bun/Cr  01-06-20 @ 06:45  BUN/CR -  54<H> / 1.85<H>  01-05-20 @ 06:00  BUN/CR -  66<H> / 1.98<H>  01-04-20 @ 17:14  BUN/CR -  72<H> / 2.07<H>  01-04-20 @ 04:30  BUN/CR -  77<H> / 2.16<H>  01-03-20 @ 23:00  BUN/CR -  82<H> / 2.36<H>      PT/INR - ( 06 Jan 2020 06:45 )   PT: 13.1 sec;   INR: 1.16 ratio             Procalcitonin, Serum: 0.59 ng/mL (01-03-20 @ 23:00)          CULTURES: (if applicable)    Culture - Urine (collected 01-04-20 @ 06:57)  Source: .Urine Clean Catch (Midstream)  Preliminary Report (01-05-20 @ 08:23):    >100,000 CFU/ml Gram Negative Rods    Culture - Blood (collected 01-03-20 @ 23:01)  Source: .Blood Blood-Peripheral  Preliminary Report (01-05-20 @ 11:01):    No growth to date.    Culture - Blood (collected 01-03-20 @ 23:00)  Source: .Blood Blood-Peripheral  Gram Stain (01-04-20 @ 21:19):    Growth in anaerobic bottle: Gram Negative Rods  Final Report (01-06-20 @ 13:25):    Growth in anaerobic bottle: Proteus mirabilis    "Due to technical problems, Proteus sp. will Not be reported as part of    the BCID panel until further notice"    ***Blood Panel PCR results on this specimen are available    approximately 3 hours after theGram stain result.***    Gram stain, PCR, and/or culture results may not always    correspond due to difference in methodologies.    ************************************************************    This PCR assay was performed using [employer].    The following targets are tested for: Enterococcus,    vancomycin resistant enterococci, Listeria monocytogenes,    coagulase negative staphylococci, S. aureus,    methicillin resistant S. aureus, Streptococcus agalactiae    (Group B), S. pneumoniae, S. pyogenes (Group A),    Acinetobacter baumannii, Enterobacter cloacae, E. coli,    Klebsiella oxytoca, K. pneumoniae, Proteus sp.,    Serratia marcescens, Haemophilus influenzae,    Neisseria meningitidis, Pseudomonas aeruginosa, Candida    albicans, C. glabrata, C krusei,C parapsilosis,    C. tropicalis and the KPC resistance gene.  Organism: Blood Culture PCR  Proteus mirabilis (01-06-20 @ 13:25)  Organism: Proteus mirabilis (01-06-20 @ 13:25)      -  Amikacin: S <=16      -  Ampicillin: S <=8 These ampicillin results predict results for amoxicillin      -  Ampicillin/Sulbactam: S <=4/2 Enterobacter, Citrobacter, and Serratia may develop resistance during prolonged therapy (3-4 days)      -  Aztreonam: S <=4      -  Cefazolin: S <=2 Enterobacter, Citrobacter, and Serratia may develop resistance during prolonged therapy (3-4 days)      -  Cefepime: S <=2      -  Cefoxitin: S <=8      -  Ceftriaxone: S <=1 Enterobacter, Citrobacter, and Serratia may develop resistance during prolonged therapy      -  Ciprofloxacin: S <=1      -  Ertapenem: S <=0.5      -  Gentamicin: S <=2      -  Levofloxacin: S <=2      -  Meropenem: S <=1      -  Piperacillin/Tazobactam: S <=8      -  Tobramycin: S <=2      -  Trimethoprim/Sulfamethoxazole: S <=2/38      Method Type: LILLY  Organism: Blood Culture PCR (01-06-20 @ 13:25)      -  Acinetobacter baumanii: Nondet      -  Candida albicans: Nondet      -  Candida glabrata: Nondet      -  Candida krusei: Nondet      -  Candida parapsilosis: Nondet      -  Candida tropicalis: Nondet      -  Coagulase negative Staphylococcus: Nondet      -  Enterobacter cloacae complex: Nondet      -  Enterococcus species: Nondet      -  Escherichia coli: Nondet      -  Haemophilus influenzae: Nondet      -  Klebsiella oxytoca: Nondet      -  Klebsiella pneumoniae: Nondet      -  Listeria monocytogenes: Nondet      -  Methicillin resistant Staphylococcus aureus (MRSA): Nondet      -  Multidrug (KPC pos) resistant organism: Nondet      -  Neisseria meningitidis: Nondet      -  Pseudomonas aeruginosa: Nondet      -  Serratia marcescens: Nondet      -  Staphylococcus aureus: Nondet      -  Streptococcus agalactiae (Group B): Nondet      -  Streptococcus pneumoniae: Nondet      -  Streptococcus pyogenes (Group A): Nondet      -  Streptococcus sp. (Not Grp A, B or S pneumoniae): Nondet      -  Vancomycin resistant Enterococcus sp.: Nondet      Method Type: PCR            CAPILLARY BLOOD GLUCOSE          VITALS:  T(C): 36.7 (01-06-20 @ 10:13), Max: 36.9 (01-05-20 @ 15:59)  T(F): 98.1 (01-06-20 @ 10:13), Max: 98.5 (01-05-20 @ 15:59)  HR: 81 (01-06-20 @ 10:13) (81 - 101)  BP: 161/51 (01-06-20 @ 10:13) (146/68 - 170/56)  BP(mean): --  ABP: --  ABP(mean): --  RR: 16 (01-06-20 @ 10:13) (16 - 16)  SpO2: 94% (01-06-20 @ 10:13) (94% - 96%)  CVP(mm Hg): --  CVP(cm H2O): --    Ins and Outs     01-05-20 @ 07:01  -  01-06-20 @ 07:00  --------------------------------------------------------  IN: 3160 mL / OUT: 9100 mL / NET: -5940 mL    01-06-20 @ 07:01  -  01-06-20 @ 13:51  --------------------------------------------------------  IN: 300 mL / OUT: 1000 mL / NET: -700 mL        Height (cm): 167.6 (01-04-20 @ 18:27)  Weight (kg): 55.1 (01-04-20 @ 02:15)  BMI (kg/m2): 19.6 (01-04-20 @ 18:27)        I&O's Detail    05 Jan 2020 07:01  -  06 Jan 2020 07:00  --------------------------------------------------------  IN:    Continuous Bladder Irrigation: 2000 mL    Oral Fluid: 1160 mL  Total IN: 3160 mL    OUT:    Continuous Bladder Irrigation: 5300 mL    Voided: 3800 mL  Total OUT: 9100 mL    Total NET: -5940 mL      06 Jan 2020 07:01  -  06 Jan 2020 13:51  --------------------------------------------------------  IN:    Oral Fluid: 300 mL  Total IN: 300 mL    OUT:    Voided: 1000 mL  Total OUT: 1000 mL    Total NET: -700 mL

## 2020-01-06 NOTE — PROGRESS NOTE ADULT - ASSESSMENT
Physical Exam  GENERAL: elderly cachetic NAD  NERVOUS SYSTEM:  Alert & Oriented X3,moves extremeties  CHEST/LUNG: CTA bilaterally; No rales, rhonchi, wheezing, or rubs  HEART: Regular rate and rhythm; +  ABDOMEN: Soft, non tender , Nondistended; Bowel sounds present, + batista with Clear urine    Assessment:  1. Hematuria - Hemorrhagic Cystitis with Gram neg bacteremia - proteus   2. CAD  3. Afib  4. Hx of bradycardia   5. Chronic kidney disease stage 4      Plan    - Urology f/u  - ID F/U   - Monitor hgb   - Cont. ceftriaxone for now and follow up culture results  - Cont. Monitor heart rate, history of bradycardia per NH papers, hemodynamically stable for now  - Hold all AV sarmad agents, avoid aspirin given bradycardia  - Cont home psych medications  - Monitor urine output, history of CKD stage 4 at home, likely baseline creatinine  - SCD for DVT prophylaxis   - Oral diet  - Continue current management per primary team, no active ccm issues reconsult as needed

## 2020-01-07 DIAGNOSIS — K92.1 MELENA: ICD-10-CM

## 2020-01-07 LAB
ANION GAP SERPL CALC-SCNC: 7 MMOL/L — SIGNIFICANT CHANGE UP (ref 5–17)
BUN SERPL-MCNC: 44 MG/DL — HIGH (ref 7–23)
CALCIUM SERPL-MCNC: 8.2 MG/DL — LOW (ref 8.4–10.5)
CHLORIDE SERPL-SCNC: 113 MMOL/L — HIGH (ref 96–108)
CO2 SERPL-SCNC: 24 MMOL/L — SIGNIFICANT CHANGE UP (ref 22–31)
CREAT SERPL-MCNC: 1.64 MG/DL — HIGH (ref 0.5–1.3)
GLUCOSE SERPL-MCNC: 84 MG/DL — SIGNIFICANT CHANGE UP (ref 70–99)
HCT VFR BLD CALC: 26.1 % — LOW (ref 39–50)
HGB BLD-MCNC: 8.4 G/DL — LOW (ref 13–17)
MCHC RBC-ENTMCNC: 32.2 GM/DL — SIGNIFICANT CHANGE UP (ref 32–36)
MCHC RBC-ENTMCNC: 32.2 PG — SIGNIFICANT CHANGE UP (ref 27–34)
MCV RBC AUTO: 100 FL — SIGNIFICANT CHANGE UP (ref 80–100)
NRBC # BLD: 0 /100 WBCS — SIGNIFICANT CHANGE UP (ref 0–0)
PLATELET # BLD AUTO: 122 K/UL — LOW (ref 150–400)
POTASSIUM SERPL-MCNC: 4.7 MMOL/L — SIGNIFICANT CHANGE UP (ref 3.5–5.3)
POTASSIUM SERPL-SCNC: 4.7 MMOL/L — SIGNIFICANT CHANGE UP (ref 3.5–5.3)
RBC # BLD: 2.61 M/UL — LOW (ref 4.2–5.8)
RBC # FLD: 14.2 % — SIGNIFICANT CHANGE UP (ref 10.3–14.5)
SODIUM SERPL-SCNC: 144 MMOL/L — SIGNIFICANT CHANGE UP (ref 135–145)
WBC # BLD: 5.55 K/UL — SIGNIFICANT CHANGE UP (ref 3.8–10.5)
WBC # FLD AUTO: 5.55 K/UL — SIGNIFICANT CHANGE UP (ref 3.8–10.5)

## 2020-01-07 PROCEDURE — 99232 SBSQ HOSP IP/OBS MODERATE 35: CPT

## 2020-01-07 RX ORDER — LISINOPRIL 2.5 MG/1
20 TABLET ORAL DAILY
Refills: 0 | Status: DISCONTINUED | OUTPATIENT
Start: 2020-01-08 | End: 2020-01-09

## 2020-01-07 RX ADMIN — PANTOPRAZOLE SODIUM 40 MILLIGRAM(S): 20 TABLET, DELAYED RELEASE ORAL at 18:00

## 2020-01-07 RX ADMIN — PANTOPRAZOLE SODIUM 40 MILLIGRAM(S): 20 TABLET, DELAYED RELEASE ORAL at 05:40

## 2020-01-07 RX ADMIN — LISINOPRIL 10 MILLIGRAM(S): 2.5 TABLET ORAL at 05:40

## 2020-01-07 RX ADMIN — SENNA PLUS 2 TABLET(S): 8.6 TABLET ORAL at 21:55

## 2020-01-07 RX ADMIN — AMLODIPINE BESYLATE 10 MILLIGRAM(S): 2.5 TABLET ORAL at 05:40

## 2020-01-07 RX ADMIN — CEFTRIAXONE 100 MILLIGRAM(S): 500 INJECTION, POWDER, FOR SOLUTION INTRAMUSCULAR; INTRAVENOUS at 18:00

## 2020-01-07 RX ADMIN — ESCITALOPRAM OXALATE 5 MILLIGRAM(S): 10 TABLET, FILM COATED ORAL at 12:40

## 2020-01-07 NOTE — PROGRESS NOTE ADULT - SUBJECTIVE AND OBJECTIVE BOX
Patient is a 89y old  Male who presents with a chief complaint of hematuria (06 Jan 2020 16:26)    Patient seen and examined at bedside.  Pt with no complaints this morning.    ALLERGIES:  aspirin (Unknown)  penicillin (Unknown)    MEDICATIONS  (STANDING):  amLODIPine   Tablet 10 milliGRAM(s) Oral daily  cefTRIAXone   IVPB 1000 milliGRAM(s) IV Intermittent every 24 hours  escitalopram 5 milliGRAM(s) Oral daily  lisinopril 10 milliGRAM(s) Oral daily  pantoprazole  Injectable 40 milliGRAM(s) IV Push two times a day  senna 2 Tablet(s) Oral at bedtime    MEDICATIONS  (PRN):  polyethylene glycol 3350 17 Gram(s) Oral daily PRN Constipation    Vital Signs Last 24 Hrs  T(F): 98 (07 Jan 2020 10:21), Max: 98.4 (06 Jan 2020 16:46)  HR: 82 (07 Jan 2020 10:21) (80 - 82)  BP: 171/54 (07 Jan 2020 10:21) (138/71 - 171/54)  RR: 16 (07 Jan 2020 10:21) (16 - 16)  SpO2: 98% (07 Jan 2020 10:21) (96% - 100%)  I&O's Summary    06 Jan 2020 07:01  -  07 Jan 2020 07:00  --------------------------------------------------------  IN: 450 mL / OUT: 2600 mL / NET: -2150 mL      PHYSICAL EXAM:  General: NAD, A/O x 2-3  ENT: MMM, no thrush  Neck: Supple, No JVD  Lungs: good air entry, clear to auscultation bilaterally, no w/r/r  Cardio: RRR, S1/S2, No murmurs  Abdomen: Soft, Nontender, Nondistended; Bowel sounds present  G/U: +batista; CBI, no blood in batista bag  Extremities: No calf tenderness, No pitting edema  Skin:  fragile, discoloration at lower exts  Neuro: speech fluent, no focal deficits    LABS:                        8.4    5.55  )-----------( 122      ( 07 Jan 2020 05:50 )             26.1     01-07    144  |  113  |  44  ----------------------------<  84  4.7   |  24  |  1.64    Ca    8.2      07 Jan 2020 05:50  Phos  4.4     01-05  Mg     2.5     01-05      eGFR if Non African American: 35 mL/min/1.73M2 (01-07-20 @ 05:50)  eGFR if : 41 mL/min/1.73M2 (01-07-20 @ 05:50)    PT/INR - ( 06 Jan 2020 06:45 )   PT: 13.1 sec;   INR: 1.16 ratio                                       Culture - Urine (collected 04 Jan 2020 06:57)  Source: .Urine Clean Catch (Midstream)  Final Report (06 Jan 2020 14:40):    >100,000 CFU/ml Proteus mirabilis  Organism: Proteus mirabilis (06 Jan 2020 14:40)  Organism: Proteus mirabilis (06 Jan 2020 14:40)      -  Amikacin: S <=16      -  Ampicillin: S <=8 These ampicillin results predict results for amoxicillin      -  Ampicillin/Sulbactam: S <=8/4 Enterobacter, Citrobacter, and Serratia may develop resistance during prolonged therapy (3-4 days)      -  Aztreonam: S <=4      -  Cefazolin: S <=8 (MIC_CL_COM_ENTERIC_CEFAZU) For uncomplicated UTI with K. pneumoniae, E. coli, or P. mirablis: LILLY <=16 is sensitive and LILLY >=32 is resistant. This also predicts results for oral agents cefaclor, cefdinir, cefpodoxime, cefprozil, cefuroxime axetil, cephalexin and locarbef for uncomplicated UTI. Note that some isolates may be susceptible to these agents while testing resistant to cefazolin.      -  Cefepime: S <=4      -  Cefoxitin: S <=8      -  Ceftriaxone: S <=1 Enterobacter, Citrobacter, and Serratia may develop resistance during prolonged therapy      -  Ciprofloxacin: S <=1      -  Gentamicin: S <=4      -  Levofloxacin: S <=2      -  Meropenem: S <=1      -  Nitrofurantoin: R >64 Should not be used to treat pyelonephritis      -  Piperacillin/Tazobactam: S <=16      -  Tobramycin: S <=4      -  Trimethoprim/Sulfamethoxazole: S <=2/38      Method Type: LILLY    Culture - Blood (collected 03 Jan 2020 23:01)  Source: .Blood Blood-Peripheral  Preliminary Report (05 Jan 2020 11:01):    No growth to date.    Culture - Blood (collected 03 Jan 2020 23:00)  Source: .Blood Blood-Peripheral  Gram Stain (04 Jan 2020 21:19):    Growth in anaerobic bottle: Gram Negative Rods  Final Report (06 Jan 2020 13:25):    Growth in anaerobic bottle: Proteus mirabilis    "Due to technical problems, Proteus sp. will Not be reported as part of    the BCID panel until further notice"    ***Blood Panel PCR results on this specimen are available    approximately 3 hours after theGram stain result.***    Gram stain, PCR, and/or culture results may not always    correspond due to difference in methodologies.    ************************************************************    This PCR assay was performed using [employer].    The following targets are tested for: Enterococcus,    vancomycin resistant enterococci, Listeria monocytogenes,    coagulase negative staphylococci, S. aureus,    methicillin resistant S. aureus, Streptococcus agalactiae    (Group B), S. pneumoniae, S. pyogenes (Group A),    Acinetobacter baumannii, Enterobacter cloacae, E. coli,    Klebsiella oxytoca, K. pneumoniae, Proteus sp.,    Serratia marcescens, Haemophilus influenzae,    Neisseria meningitidis, Pseudomonas aeruginosa, Candida    albicans, C. glabrata, C krusei,C parapsilosis,    C. tropicalis and the KPC resistance gene.  Organism: Blood Culture PCR  Proteus mirabilis (06 Jan 2020 13:25)  Organism: Proteus mirabilis (06 Jan 2020 13:25)      -  Amikacin: S <=16      -  Ampicillin: S <=8 These ampicillin results predict results for amoxicillin      -  Ampicillin/Sulbactam: S <=4/2 Enterobacter, Citrobacter, and Serratia may develop resistance during prolonged therapy (3-4 days)      -  Aztreonam: S <=4      -  Cefazolin: S <=2 Enterobacter, Citrobacter, and Serratia may develop resistance during prolonged therapy (3-4 days)      -  Cefepime: S <=2      -  Cefoxitin: S <=8      -  Ceftriaxone: S <=1 Enterobacter, Citrobacter, and Serratia may develop resistance during prolonged therapy      -  Ciprofloxacin: S <=1      -  Ertapenem: S <=0.5      -  Gentamicin: S <=2      -  Levofloxacin: S <=2      -  Meropenem: S <=1      -  Piperacillin/Tazobactam: S <=8      -  Tobramycin: S <=2      -  Trimethoprim/Sulfamethoxazole: S <=2/38      Method Type: LILLY  Organism: Blood Culture PCR (06 Jan 2020 13:25)      -  Acinetobacter baumanii: Nondet      -  Candida albicans: Nondet      -  Candida glabrata: Nondet      -  Candida krusei: Nondet      -  Candida parapsilosis: Nondet      -  Candida tropicalis: Nondet      -  Coagulase negative Staphylococcus: Nondet      -  Enterobacter cloacae complex: Nondet      -  Enterococcus species: Nondet      -  Escherichia coli: Nondet      -  Haemophilus influenzae: Nondet      -  Klebsiella oxytoca: Nondet      -  Klebsiella pneumoniae: Nondet      -  Listeria monocytogenes: Nondet      -  Methicillin resistant Staphylococcus aureus (MRSA): Nondet      -  Multidrug (KPC pos) resistant organism: Nondet      -  Neisseria meningitidis: Nondet      -  Pseudomonas aeruginosa: Nondet      -  Serratia marcescens: Nondet      -  Staphylococcus aureus: Nondet      -  Streptococcus agalactiae (Group B): Nondet      -  Streptococcus pneumoniae: Nondet      -  Streptococcus pyogenes (Group A): Nondet      -  Streptococcus sp. (Not Grp A, B or S pneumoniae): Nondet      -  Vancomycin resistant Enterococcus sp.: Nondet      Method Type: PCR        RADIOLOGY & ADDITIONAL TESTS:    Care Discussed with Consultants/Other Providers: Patient is a 89y old  Male who presents with a chief complaint of hematuria (06 Jan 2020 16:26)    Patient seen and examined at bedside.  Pt with no complaints this morning.    ALLERGIES:  aspirin (Unknown)  penicillin (Unknown)    MEDICATIONS  (STANDING):  amLODIPine   Tablet 10 milliGRAM(s) Oral daily  cefTRIAXone   IVPB 1000 milliGRAM(s) IV Intermittent every 24 hours  escitalopram 5 milliGRAM(s) Oral daily  lisinopril 10 milliGRAM(s) Oral daily  pantoprazole  Injectable 40 milliGRAM(s) IV Push two times a day  senna 2 Tablet(s) Oral at bedtime    MEDICATIONS  (PRN):  polyethylene glycol 3350 17 Gram(s) Oral daily PRN Constipation    Vital Signs Last 24 Hrs  T(F): 98 (07 Jan 2020 10:21), Max: 98.4 (06 Jan 2020 16:46)  HR: 82 (07 Jan 2020 10:21) (80 - 82)  BP: 171/54 (07 Jan 2020 10:21) (138/71 - 171/54)  RR: 16 (07 Jan 2020 10:21) (16 - 16)  SpO2: 98% (07 Jan 2020 10:21) (96% - 100%)  I&O's Summary    06 Jan 2020 07:01  -  07 Jan 2020 07:00  --------------------------------------------------------  IN: 450 mL / OUT: 2600 mL / NET: -2150 mL      PHYSICAL EXAM:  General: NAD, A/O x 2-3  ENT: MMM, no thrush  Neck: Supple, No JVD  Lungs: good air entry, clear to auscultation bilaterally, no w/r/r  Cardio: RRR, S1/S2, No murmurs  Abdomen: Soft, Nontender, Nondistended; Bowel sounds present  G/U: +batista; CBI, no blood in batista bag  Extremities: No calf tenderness, No pitting edema  Skin:  fragile, discoloration at lower exts  Neuro: speech fluent, no focal deficits    LABS:                        8.4    5.55  )-----------( 122      ( 07 Jan 2020 05:50 )             26.1     01-07    144  |  113  |  44  ----------------------------<  84  4.7   |  24  |  1.64    Ca    8.2      07 Jan 2020 05:50  Phos  4.4     01-05  Mg     2.5     01-05      eGFR if Non African American: 35 mL/min/1.73M2 (01-07-20 @ 05:50)  eGFR if : 41 mL/min/1.73M2 (01-07-20 @ 05:50)    PT/INR - ( 06 Jan 2020 06:45 )   PT: 13.1 sec;   INR: 1.16 ratio            Culture - Urine (collected 04 Jan 2020 06:57)  Source: .Urine Clean Catch (Midstream)  Final Report (06 Jan 2020 14:40):    >100,000 CFU/ml Proteus mirabilis  Organism: Proteus mirabilis (06 Jan 2020 14:40)  Organism: Proteus mirabilis (06 Jan 2020 14:40)      -  Amikacin: S <=16      -  Ampicillin: S <=8 These ampicillin results predict results for amoxicillin      -  Ampicillin/Sulbactam: S <=8/4 Enterobacter, Citrobacter, and Serratia may develop resistance during prolonged therapy (3-4 days)      -  Aztreonam: S <=4      -  Cefazolin: S <=8 (MIC_CL_COM_ENTERIC_CEFAZU) For uncomplicated UTI with K. pneumoniae, E. coli, or P. mirablis: LILLY <=16 is sensitive and LILLY >=32 is resistant. This also predicts results for oral agents cefaclor, cefdinir, cefpodoxime, cefprozil, cefuroxime axetil, cephalexin and locarbef for uncomplicated UTI. Note that some isolates may be susceptible to these agents while testing resistant to cefazolin.      -  Cefepime: S <=4      -  Cefoxitin: S <=8      -  Ceftriaxone: S <=1 Enterobacter, Citrobacter, and Serratia may develop resistance during prolonged therapy      -  Ciprofloxacin: S <=1      -  Gentamicin: S <=4      -  Levofloxacin: S <=2      -  Meropenem: S <=1      -  Nitrofurantoin: R >64 Should not be used to treat pyelonephritis      -  Piperacillin/Tazobactam: S <=16      -  Tobramycin: S <=4      -  Trimethoprim/Sulfamethoxazole: S <=2/38      Method Type: LILLY    Culture - Blood (collected 03 Jan 2020 23:01)  Source: .Blood Blood-Peripheral  Preliminary Report (05 Jan 2020 11:01):    No growth to date.    Culture - Blood (collected 03 Jan 2020 23:00)  Source: .Blood Blood-Peripheral  Gram Stain (04 Jan 2020 21:19):    Growth in anaerobic bottle: Gram Negative Rods  Final Report (06 Jan 2020 13:25):    Growth in anaerobic bottle: Proteus mirabilis    "Due to technical problems, Proteus sp. will Not be reported as part of    the BCID panel until further notice"    ***Blood Panel PCR results on this specimen are available    approximately 3 hours after theGram stain result.***    Gram stain, PCR, and/or culture results may not always    correspond due to difference in methodologies.    ************************************************************    This PCR assay was performed using [employer].    The following targets are tested for: Enterococcus,    vancomycin resistant enterococci, Listeria monocytogenes,    coagulase negative staphylococci, S. aureus,    methicillin resistant S. aureus, Streptococcus agalactiae    (Group B), S. pneumoniae, S. pyogenes (Group A),    Acinetobacter baumannii, Enterobacter cloacae, E. coli,    Klebsiella oxytoca, K. pneumoniae, Proteus sp.,    Serratia marcescens, Haemophilus influenzae,    Neisseria meningitidis, Pseudomonas aeruginosa, Candida    albicans, C. glabrata, C krusei,C parapsilosis,    C. tropicalis and the KPC resistance gene.  Organism: Blood Culture PCR  Proteus mirabilis (06 Jan 2020 13:25)  Organism: Proteus mirabilis (06 Jan 2020 13:25)      -  Amikacin: S <=16      -  Ampicillin: S <=8 These ampicillin results predict results for amoxicillin      -  Ampicillin/Sulbactam: S <=4/2 Enterobacter, Citrobacter, and Serratia may develop resistance during prolonged therapy (3-4 days)      -  Aztreonam: S <=4      -  Cefazolin: S <=2 Enterobacter, Citrobacter, and Serratia may develop resistance during prolonged therapy (3-4 days)      -  Cefepime: S <=2      -  Cefoxitin: S <=8      -  Ceftriaxone: S <=1 Enterobacter, Citrobacter, and Serratia may develop resistance during prolonged therapy      -  Ciprofloxacin: S <=1      -  Ertapenem: S <=0.5      -  Gentamicin: S <=2      -  Levofloxacin: S <=2      -  Meropenem: S <=1      -  Piperacillin/Tazobactam: S <=8      -  Tobramycin: S <=2      -  Trimethoprim/Sulfamethoxazole: S <=2/38      Method Type: LILLY  Organism: Blood Culture PCR (06 Jan 2020 13:25)      -  Acinetobacter baumanii: Nondet      -  Candida albicans: Nondet      -  Candida glabrata: Nondet      -  Candida krusei: Nondet      -  Candida parapsilosis: Nondet      -  Candida tropicalis: Nondet      -  Coagulase negative Staphylococcus: Nondet      -  Enterobacter cloacae complex: Nondet      -  Enterococcus species: Nondet      -  Escherichia coli: Nondet      -  Haemophilus influenzae: Nondet      -  Klebsiella oxytoca: Nondet      -  Klebsiella pneumoniae: Nondet      -  Listeria monocytogenes: Nondet      -  Methicillin resistant Staphylococcus aureus (MRSA): Nondet      -  Multidrug (KPC pos) resistant organism: Nondet      -  Neisseria meningitidis: Nondet      -  Pseudomonas aeruginosa: Nondet      -  Serratia marcescens: Nondet      -  Staphylococcus aureus: Nondet      -  Streptococcus agalactiae (Group B): Nondet      -  Streptococcus pneumoniae: Nondet      -  Streptococcus pyogenes (Group A): Nondet      -  Streptococcus sp. (Not Grp A, B or S pneumoniae): Nondet      -  Vancomycin resistant Enterococcus sp.: Nondet      Method Type: PCR        RADIOLOGY & ADDITIONAL TESTS:    Care Discussed with Consultants/Other Providers:

## 2020-01-07 NOTE — PROGRESS NOTE ADULT - SUBJECTIVE AND OBJECTIVE BOX
CC: f/u for proteus bacteremia    Patient reports  he is feeling well   REVIEW OF SYSTEMS:  All other review of systems negative (Comprehensive ROS)    Antimicrobials Day #  :4/14  cefTRIAXone   IVPB 1000 milliGRAM(s) IV Intermittent every 24 hours    Other Medications Reviewed    T(F): 97.8 (01-07-20 @ 16:20), Max: 98.1 (01-06-20 @ 21:00)  HR: 77 (01-07-20 @ 16:20)  BP: 146/63 (01-07-20 @ 16:20)  RR: 16 (01-07-20 @ 16:20)  SpO2: 100% (01-07-20 @ 16:20)  Wt(kg): --    PHYSICAL EXAM:  General: alert, no acute distress  Eyes:  anicteric, no conjunctival injection, no discharge  Oropharynx: no lesions or injection 	  Neck: supple, without adenopathy  Lungs: clear to auscultation  Heart: regular rate and rhythm; no murmur, rubs or gallops  Abdomen: soft, nondistended, nontender, without mass or organomegaly  Skin: no lesions  Extremities: no clubbing, cyanosis, or edema  Neurologic: alert, oriented, moves all extremities  batista with cbi, urine clear   LAB RESULTS:                        8.4    5.55  )-----------( 122      ( 07 Jan 2020 05:50 )             26.1     01-07    144  |  113<H>  |  44<H>  ----------------------------<  84  4.7   |  24  |  1.64<H>    Ca    8.2<L>      07 Jan 2020 05:50          MICROBIOLOGY:  RECENT CULTURES:  01-04 @ 06:57 .Urine Clean Catch (Midstream) Proteus mirabilis    >100,000 CFU/ml Proteus mirabilis      01-03 @ 23:01 .Blood Blood-Peripheral     No growth to date.      01-03 @ 23:00 .Blood Blood-Peripheral Blood Culture PCR  Proteus mirabilis    Growth in anaerobic bottle: Proteus mirabilis  "Due to technical problems, Proteus sp. will Not be reported as part of  the BCID panel until further notice"  ***Blood Panel PCR results on this specimen are available  approximately 3 hours after theGram stain result.***  Gram stain, PCR, and/or culture results may not always  correspond due to difference in methodologies.  ************************************************************  This PCR assay was performed using [facility].  The following targets are tested for: Enterococcus,  vancomycin resistant enterococci, Listeria monocytogenes,  coagulase negative staphylococci, S. aureus,  methicillin resistant S. aureus, Streptococcus agalactiae  (Group B), S. pneumoniae, S. pyogenes (Group A),  Acinetobacter baumannii, Enterobacter cloacae, E. coli,  Klebsiella oxytoca, K. pneumoniae, Proteus sp.,  Serratia marcescens, Haemophilus influenzae,  Neisseria meningitidis, Pseudomonas aeruginosa, Candida  albicans, C. glabrata, C krusei,C parapsilosis,  C. tropicalis and the KPC resistance gene.    Growth in anaerobic bottle: Gram Negative Rods        RADIOLOGY REVIEWED:  < from: US Kidney and Bladder (01.04.20 @ 11:04) >  IMPRESSION:    Chronic bilateral medical renal disease without evidence for postrenal obstruction.     < end of copied text >    < from: CT Abdomen and Pelvis No Cont (01.04.20 @ 02:23) >    EXAM:  CT ABDOMEN AND PELVIS      PROCEDURE DATE:  01/04/2020        INTERPRETATION:  CLINICAL INFORMATION: Abdominal pain. Blood in urine.    COMPARISON: None.    PROCEDURE:   CT of the Abdomen and Pelvis was performed without intravenous contrast.   Intravenous contrast: None.  Oral contrast: None.  Sagittal and coronal reformats were performed.    FINDINGS:    LOWER CHEST: Small left pleural effusion with mural thickening probable rounded atelectasis in the left lower lobe. Left pleural calcification. Cardiomegaly.     LIVER: Within normal limits.  BILE DUCTS: Normal caliber.   GALLBLADDER: Within normal limits.  SPLEEN: Within normal limits.  PANCREAS: Within normal limits.  ADRENALS: Within normal limits.  KIDNEYS/URETERS: Mild bilateral hydroureteronephrosis to the level of the urinary bladder.     BLADDER: Mural thickening of the urinary bladder with surrounding inflammatory change. Foci of air within the bladder, which contains a Batista catheter balloon.  REPRODUCTIVE ORGANS: Theprostate is within normal limits.    BOWEL: No bowel obstruction. Normal appendix. Large amount of stool within the rectum.  PERITONEUM: No ascites.  VESSELS:  Atherosclerotic change of the abdominal aorta and its branches.  RETROPERITONEUM: No lymphadenopathy.    ABDOMINAL WALL: Within normal limits.  BONES: Degenerative changes of the spine. Chronic ununited right femoral neck fracture.    IMPRESSION:   Cystitis.    Mild bilateral hydroureteronephrosis to the level of the urinary bladder.    Large amount of stool within the rectum.          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

## 2020-01-07 NOTE — PROGRESS NOTE ADULT - SUBJECTIVE AND OBJECTIVE BOX
INTERVAL HPI/OVERNIGHT EVENTS:  HPI:  89 y/o pmh Afib who is admitted with hematuria and melena. Patient seen and examined at bedside. No further reports or melena reported by staff. Patient did have brown/solid BM yesterday. No abdominal pain, nausea, vomiting.     MEDICATIONS  (STANDING):  amLODIPine   Tablet 10 milliGRAM(s) Oral daily  cefTRIAXone   IVPB 1000 milliGRAM(s) IV Intermittent every 24 hours  escitalopram 5 milliGRAM(s) Oral daily  lisinopril 10 milliGRAM(s) Oral daily  pantoprazole  Injectable 40 milliGRAM(s) IV Push two times a day  senna 2 Tablet(s) Oral at bedtime    MEDICATIONS  (PRN):  polyethylene glycol 3350 17 Gram(s) Oral daily PRN Constipation      Allergies    aspirin (Unknown)  penicillin (Unknown)    Intolerances        PHYSICAL EXAM:   Vital Signs:  Vital Signs Last 24 Hrs  T(C): 36.7 (2020 10:21), Max: 36.9 (2020 16:46)  T(F): 98 (2020 10:21), Max: 98.4 (2020 16:46)  HR: 82 (2020 10:21) (80 - 82)  BP: 171/54 (2020 10:21) (138/71 - 171/54)  BP(mean): --  RR: 16 (2020 10:21) (16 - 16)  SpO2: 98% (2020 10:21) (96% - 100%)  Daily     Daily Weight in k.1 (2020 05:20)I&O's Summary    2020 07:01  -  2020 07:00  --------------------------------------------------------  IN: 450 mL / OUT: 2600 mL / NET: -2150 mL        GENERAL:  Appears stated age  HEENT:  NC/AT,  conjunctivae clear and pink  CHEST:  Full & symmetric excursion, no increased effort, breath sounds clear  HEART:  Regular rhythm, S1, S2, no murmur  ABDOMEN:  Soft, non-tender, non-distended, normoactive bowel sounds  EXTEREMITIES:  no edema  SKIN:  No rash, warm/dry  NEURO:  Alert, oriented      LABS:                        8.4    5.55  )-----------( 122      ( 2020 05:50 )             26.1     01-07    144  |  113<H>  |  44<H>  ----------------------------<  84  4.7   |  24  |  1.64<H>    Ca    8.2<L>      2020 05:50      PT/INR - ( 2020 06:45 )   PT: 13.1 sec;   INR: 1.16 ratio             amylase   lipase  RADIOLOGY & ADDITIONAL TESTS:

## 2020-01-07 NOTE — PROGRESS NOTE ADULT - ASSESSMENT
89 y/o M PMH Afib, HTN brought from NH for evaluation of bleeding from penis, pt also had low H/H about hbg of 7. Pt was noted to have bout of melena in ED as well as gross hematuria. Pt placed on CBI for irrigation. Pt had elevated INR 1.7, but denied taking AC any time recently, states was on coumadin, but was stopped many years ago. Pt admitted to the ICU for ABLA and coagulopathy with hematuria and melena. Transferred to tele 1/4.     Acute blood loss anemia, secondary to hematuria and GI bleed (fecal occult blood +):  - Improved s/p 2 units PRBCs on 1/4, check cbc daily  - H&H stabilized, 8.4/26  - Continue CBI; hematuria cleared, await Urology input today for plan for void trial  - GI consulted, no plans now for further interventions (EGD), continue IV protonix, moniter H/H    Coagulopathy   - s/p FFP, vitamin K on 1/4  - INR now 1.16    Proteus Bacteremia, UTI:  - Continue ceftriaxone for now, f/u final blood culture sensitivities per ID recommendation  - Afebrile, WBCs normal    HTN  - continue Norvasc and Lisinopril, follow BP's    Afib; paroxysmal:  - not on AC, rate is controlled    Hx of bradycardia   - monitor tele     Chronic kidney disease stage 4  - Cr improving, 1.64 today, unknown baseline Cr  - Monitor    Depression  - on lexapro    DVT ppx  - scds 89 y/o M PMH Afib, HTN brought from NH for evaluation of bleeding from penis, pt also had low H/H about hbg of 7. Pt was noted to have bout of melena in ED as well as gross hematuria. Pt placed on CBI for irrigation. Pt had elevated INR 1.7, but denied taking AC any time recently, states was on coumadin, but was stopped many years ago. Pt admitted to the ICU for ABLA and coagulopathy with hematuria and melena. Transferred to tele 1/4.     Acute blood loss anemia, secondary to hematuria and GI bleed (fecal occult blood +):  - no new episodes of GI bleeds and H/H stable  - s/p 2 units PRBCs on 1/4, check cbc daily  - H&H stabilized, 8.4/26  - Continue CBI; hematuria cleared, await Urology input today for plan for void trial  - GI consulted, no plans now for further interventions (EGD), continue IV protonix, moniter H/H    Coagulopathy   - s/p FFP, vitamin K on 1/4  - INR now 1.16    Thrombocytopenia  - monitor. platelet count stable    Proteus Bacteremia, UTI:  - Continue ceftriaxone for now, f/u final blood culture sensitivities per ID recommendation  - Afebrile, WBCs normal  - cont abx for 14 days total    HTN  - continue Norvasc 10mg   - increase Lisinopril to 20mg   - SBP elevated (138-171)    Afib; paroxysmal:  - not on AC, rate is controlled    Hx of bradycardia   - monitor tele     Chronic kidney disease stage 4 - stable  - Cr improving, 1.64 today. at baseline  - last known Cr 1.6 in 2018  - Monitor    Depression  - on lexapro    DVT ppx  - scds

## 2020-01-08 LAB
ANION GAP SERPL CALC-SCNC: 12 MMOL/L — SIGNIFICANT CHANGE UP (ref 5–17)
BUN SERPL-MCNC: 38 MG/DL — HIGH (ref 7–23)
CALCIUM SERPL-MCNC: 8.2 MG/DL — LOW (ref 8.4–10.5)
CHLORIDE SERPL-SCNC: 111 MMOL/L — HIGH (ref 96–108)
CO2 SERPL-SCNC: 21 MMOL/L — LOW (ref 22–31)
CREAT SERPL-MCNC: 1.66 MG/DL — HIGH (ref 0.5–1.3)
GLUCOSE SERPL-MCNC: 84 MG/DL — SIGNIFICANT CHANGE UP (ref 70–99)
HCT VFR BLD CALC: 26.9 % — LOW (ref 39–50)
HGB BLD-MCNC: 8.5 G/DL — LOW (ref 13–17)
MCHC RBC-ENTMCNC: 31.6 GM/DL — LOW (ref 32–36)
MCHC RBC-ENTMCNC: 31.6 PG — SIGNIFICANT CHANGE UP (ref 27–34)
MCV RBC AUTO: 100 FL — SIGNIFICANT CHANGE UP (ref 80–100)
NRBC # BLD: 0 /100 WBCS — SIGNIFICANT CHANGE UP (ref 0–0)
PLATELET # BLD AUTO: 143 K/UL — LOW (ref 150–400)
POTASSIUM SERPL-MCNC: 4.5 MMOL/L — SIGNIFICANT CHANGE UP (ref 3.5–5.3)
POTASSIUM SERPL-SCNC: 4.5 MMOL/L — SIGNIFICANT CHANGE UP (ref 3.5–5.3)
RBC # BLD: 2.69 M/UL — LOW (ref 4.2–5.8)
RBC # FLD: 14 % — SIGNIFICANT CHANGE UP (ref 10.3–14.5)
SODIUM SERPL-SCNC: 144 MMOL/L — SIGNIFICANT CHANGE UP (ref 135–145)
WBC # BLD: 6.68 K/UL — SIGNIFICANT CHANGE UP (ref 3.8–10.5)
WBC # FLD AUTO: 6.68 K/UL — SIGNIFICANT CHANGE UP (ref 3.8–10.5)

## 2020-01-08 PROCEDURE — 93010 ELECTROCARDIOGRAM REPORT: CPT

## 2020-01-08 PROCEDURE — 99233 SBSQ HOSP IP/OBS HIGH 50: CPT | Mod: GC

## 2020-01-08 RX ORDER — TAMSULOSIN HYDROCHLORIDE 0.4 MG/1
0.4 CAPSULE ORAL AT BEDTIME
Refills: 0 | Status: DISCONTINUED | OUTPATIENT
Start: 2020-01-08 | End: 2020-01-09

## 2020-01-08 RX ADMIN — LISINOPRIL 20 MILLIGRAM(S): 2.5 TABLET ORAL at 05:30

## 2020-01-08 RX ADMIN — SENNA PLUS 2 TABLET(S): 8.6 TABLET ORAL at 21:31

## 2020-01-08 RX ADMIN — TAMSULOSIN HYDROCHLORIDE 0.4 MILLIGRAM(S): 0.4 CAPSULE ORAL at 21:31

## 2020-01-08 RX ADMIN — ESCITALOPRAM OXALATE 5 MILLIGRAM(S): 10 TABLET, FILM COATED ORAL at 11:56

## 2020-01-08 RX ADMIN — PANTOPRAZOLE SODIUM 40 MILLIGRAM(S): 20 TABLET, DELAYED RELEASE ORAL at 17:58

## 2020-01-08 RX ADMIN — PANTOPRAZOLE SODIUM 40 MILLIGRAM(S): 20 TABLET, DELAYED RELEASE ORAL at 05:30

## 2020-01-08 RX ADMIN — AMLODIPINE BESYLATE 10 MILLIGRAM(S): 2.5 TABLET ORAL at 05:30

## 2020-01-08 RX ADMIN — CEFTRIAXONE 100 MILLIGRAM(S): 500 INJECTION, POWDER, FOR SOLUTION INTRAMUSCULAR; INTRAVENOUS at 16:00

## 2020-01-08 NOTE — PROGRESS NOTE ADULT - SUBJECTIVE AND OBJECTIVE BOX
CC: f/u for uti, proteus bacteremia    Patient reports he feels well     REVIEW OF SYSTEMS:  All other review of systems negative (Comprehensive ROS)    Antimicrobials Day #  :5/14  cefTRIAXone   IVPB 1000 milliGRAM(s) IV Intermittent every 24 hours    Other Medications Reviewed    T(F): 97.6 (01-08-20 @ 05:07), Max: 97.8 (01-07-20 @ 16:20)  HR: 56 (01-08-20 @ 10:45)  BP: 136/51 (01-08-20 @ 10:45)  RR: 17 (01-08-20 @ 05:07)  SpO2: 100% (01-08-20 @ 10:45)  Wt(kg): --    PHYSICAL EXAM:  General: alert, no acute distress  Eyes:  anicteric, no conjunctival injection, no discharge  Oropharynx: no lesions or injection 	  Neck: supple, without adenopathy  Lungs: clear to auscultation  Heart: regular rate and rhythm; no murmur, rubs or gallops  Abdomen: soft, nondistended, nontender, without mass or organomegaly  Skin: no lesions  Extremities: no clubbing, cyanosis, or edema  Neurologic: alert, oriented, moves all extremities    LAB RESULTS:                        8.5    6.68  )-----------( 143      ( 08 Jan 2020 05:45 )             26.9     01-08    144  |  111<H>  |  38<H>  ----------------------------<  84  4.5   |  21<L>  |  1.66<H>    Ca    8.2<L>      08 Jan 2020 05:45          MICROBIOLOGY:  RECENT CULTURES:  01-06 @ 18:00 .Blood Blood-Venous     No growth to date.      01-04 @ 06:57 .Urine Clean Catch (Midstream) Proteus mirabilis    >100,000 CFU/ml Proteus mirabilis      01-03 @ 23:01 .Blood Blood-Peripheral     No growth to date.      01-03 @ 23:00 .Blood Blood-Peripheral Blood Culture PCR  Proteus mirabilis    Growth in anaerobic bottle: Proteus mirabilis  "Due to technical problems, Proteus sp. will Not be reported as part of  the BCID panel until further notice"  ***Blood Panel PCR results on this specimen are available  approximately 3 hours after theGram stain result.***  Gram stain, PCR, and/or culture results may not always  correspond due to difference in methodologies.  ************************************************************  This PCR assay was performed using [employer].  The following targets are tested for: Enterococcus,  vancomycin resistant enterococci, Listeria monocytogenes,  coagulase negative staphylococci, S. aureus,  methicillin resistant S. aureus, Streptococcus agalactiae  (Group B), S. pneumoniae, S. pyogenes (Group A),  Acinetobacter baumannii, Enterobacter cloacae, E. coli,  Klebsiella oxytoca, K. pneumoniae, Proteus sp.,  Serratia marcescens, Haemophilus influenzae,  Neisseria meningitidis, Pseudomonas aeruginosa, Candida  albicans, C. glabrata, C krusei,C parapsilosis,  C. tropicalis and the KPC resistance gene.    Growth in anaerobic bottle: Gram Negative Rods        RADIOLOGY REVIEWED:  < from: CT Abdomen and Pelvis No Cont (01.04.20 @ 02:23) >  EXAM:  CT ABDOMEN AND PELVIS      PROCEDURE DATE:  01/04/2020        INTERPRETATION:  CLINICAL INFORMATION: Abdominal pain. Blood in urine.    COMPARISON: None.    PROCEDURE:   CT of the Abdomen and Pelvis was performed without intravenous contrast.   Intravenous contrast: None.  Oral contrast: None.  Sagittal and coronal reformats were performed.    FINDINGS:    LOWER CHEST: Small left pleural effusion with mural thickening probable rounded atelectasis in the left lower lobe. Left pleural calcification. Cardiomegaly.     LIVER: Within normal limits.  BILE DUCTS: Normal caliber.   GALLBLADDER: Within normal limits.  SPLEEN: Within normal limits.  PANCREAS: Within normal limits.  ADRENALS: Within normal limits.  KIDNEYS/URETERS: Mild bilateral hydroureteronephrosis to the level of the urinary bladder.     BLADDER: Mural thickening of the urinary bladder with surrounding inflammatory change. Foci of air within the bladder, which contains a Batista catheter balloon.  REPRODUCTIVE ORGANS: Theprostate is within normal limits.    BOWEL: No bowel obstruction. Normal appendix. Large amount of stool within the rectum.  PERITONEUM: No ascites.  VESSELS:  Atherosclerotic change of the abdominal aorta and its branches.  RETROPERITONEUM: No lymphadenopathy.    ABDOMINAL WALL: Within normal limits.  BONES: Degenerative changes of the spine. Chronic ununited right femoral neck fracture.    IMPRESSION:   Cystitis.    Mild bilateral hydroureteronephrosis to the level of the urinary bladder.    Large amount of stool within the rectum.    < end of copied text >              Assessment:  patient with hematuria, retention, batista and cbi, proteus bacteremia from gu source. doing well, off cbi. f/u bc are neg  Plan:  continue ceftriaxone, if continues to do well can change to po ceftin 500mg po bid after tomorrow dose of ceftriaxone to finish total 14 days abx  bowel regimen  batista per gu

## 2020-01-08 NOTE — PROGRESS NOTE ADULT - SUBJECTIVE AND OBJECTIVE BOX
Patient is a 89y old  Male who presents with a chief complaint of hematuria / UTI (08 Jan 2020 07:39)      Patient seen and examined at bedside.    ALLERGIES:  aspirin (Unknown)  penicillin (Unknown)    MEDICATIONS  (STANDING):  amLODIPine   Tablet 10 milliGRAM(s) Oral daily  cefTRIAXone   IVPB 1000 milliGRAM(s) IV Intermittent every 24 hours  escitalopram 5 milliGRAM(s) Oral daily  lisinopril 20 milliGRAM(s) Oral daily  pantoprazole  Injectable 40 milliGRAM(s) IV Push two times a day  senna 2 Tablet(s) Oral at bedtime  tamsulosin 0.4 milliGRAM(s) Oral at bedtime    MEDICATIONS  (PRN):  polyethylene glycol 3350 17 Gram(s) Oral daily PRN Constipation    Vital Signs Last 24 Hrs  T(F): 97.6 (08 Jan 2020 05:07), Max: 98 (07 Jan 2020 10:21)  HR: 81 (08 Jan 2020 05:07) (77 - 82)  BP: 148/99 (08 Jan 2020 05:07) (144/56 - 171/54)  RR: 17 (08 Jan 2020 05:07) (16 - 17)  SpO2: 99% (08 Jan 2020 05:07) (98% - 100%)  I&O's Summary    07 Jan 2020 07:01  -  08 Jan 2020 07:00  --------------------------------------------------------  IN: 250 mL / OUT: 3600 mL / NET: -3350 mL    08 Jan 2020 07:01  -  08 Jan 2020 09:36  --------------------------------------------------------  IN: 360 mL / OUT: 0 mL / NET: 360 mL      PHYSICAL EXAM:  General: NAD, A/O x 3  ENT: MMM  Neck: Supple, No JVD  Lungs: Clear to auscultation bilaterally  Cardio: RRR, S1/S2, No murmurs  Abdomen: Soft, Nontender, Nondistended; Bowel sounds present  Extremities: No calf tenderness, No pitting edema    LABS:                        8.5    6.68  )-----------( 143      ( 08 Jan 2020 05:45 )             26.9     01-08    144  |  111  |  38  ----------------------------<  84  4.5   |  21  |  1.66    Ca    8.2      08 Jan 2020 05:45      eGFR if Non African American: 35 mL/min/1.73M2 (01-08-20 @ 05:45)  eGFR if African American: 40 mL/min/1.73M2 (01-08-20 @ 05:45)    PT/INR - ( 06 Jan 2020 06:45 )   PT: 13.1 sec;   INR: 1.16 ratio                                       Culture - Blood (collected 06 Jan 2020 18:00)  Source: .Blood Blood-Peripheral  Preliminary Report (08 Jan 2020 03:02):    No growth to date.    Culture - Blood (collected 06 Jan 2020 18:00)  Source: .Blood Blood-Venous  Preliminary Report (08 Jan 2020 03:02):    No growth to date.    Culture - Urine (collected 04 Jan 2020 06:57)  Source: .Urine Clean Catch (Midstream)  Final Report (06 Jan 2020 14:40):    >100,000 CFU/ml Proteus mirabilis  Organism: Proteus mirabilis (06 Jan 2020 14:40)  Organism: Proteus mirabilis (06 Jan 2020 14:40)      -  Amikacin: S <=16      -  Ampicillin: S <=8 These ampicillin results predict results for amoxicillin      -  Ampicillin/Sulbactam: S <=8/4 Enterobacter, Citrobacter, and Serratia may develop resistance during prolonged therapy (3-4 days)      -  Aztreonam: S <=4      -  Cefazolin: S <=8 (MIC_CL_COM_ENTERIC_CEFAZU) For uncomplicated UTI with K. pneumoniae, E. coli, or P. mirablis: LILLY <=16 is sensitive and LILLY >=32 is resistant. This also predicts results for oral agents cefaclor, cefdinir, cefpodoxime, cefprozil, cefuroxime axetil, cephalexin and locarbef for uncomplicated UTI. Note that some isolates may be susceptible to these agents while testing resistant to cefazolin.      -  Cefepime: S <=4      -  Cefoxitin: S <=8      -  Ceftriaxone: S <=1 Enterobacter, Citrobacter, and Serratia may develop resistance during prolonged therapy      -  Ciprofloxacin: S <=1      -  Gentamicin: S <=4      -  Levofloxacin: S <=2      -  Meropenem: S <=1      -  Nitrofurantoin: R >64 Should not be used to treat pyelonephritis      -  Piperacillin/Tazobactam: S <=16      -  Tobramycin: S <=4      -  Trimethoprim/Sulfamethoxazole: S <=2/38      Method Type: LILLY    Culture - Blood (collected 03 Jan 2020 23:01)  Source: .Blood Blood-Peripheral  Preliminary Report (05 Jan 2020 11:01):    No growth to date.    Culture - Blood (collected 03 Jan 2020 23:00)  Source: .Blood Blood-Peripheral  Gram Stain (04 Jan 2020 21:19):    Growth in anaerobic bottle: Gram Negative Rods  Final Report (06 Jan 2020 13:25):    Growth in anaerobic bottle: Proteus mirabilis    "Due to technical problems, Proteus sp. will Not be reported as part of    the BCID panel until further notice"    ***Blood Panel PCR results on this specimen are available    approximately 3 hours after theGram stain result.***    Gram stain, PCR, and/or culture results may not always    correspond due to difference in methodologies.    ************************************************************    This PCR assay was performed using [employer].    The following targets are tested for: Enterococcus,    vancomycin resistant enterococci, Listeria monocytogenes,    coagulase negative staphylococci, S. aureus,    methicillin resistant S. aureus, Streptococcus agalactiae    (Group B), S. pneumoniae, S. pyogenes (Group A),    Acinetobacter baumannii, Enterobacter cloacae, E. coli,    Klebsiella oxytoca, K. pneumoniae, Proteus sp.,    Serratia marcescens, Haemophilus influenzae,    Neisseria meningitidis, Pseudomonas aeruginosa, Candida    albicans, C. glabrata, C krusei,C parapsilosis,    C. tropicalis and the KPC resistance gene.  Organism: Blood Culture PCR  Proteus mirabilis (06 Jan 2020 13:25)  Organism: Proteus mirabilis (06 Jan 2020 13:25)      -  Amikacin: S <=16      -  Ampicillin: S <=8 These ampicillin results predict results for amoxicillin      -  Ampicillin/Sulbactam: S <=4/2 Enterobacter, Citrobacter, and Serratia may develop resistance during prolonged therapy (3-4 days)      -  Aztreonam: S <=4      -  Cefazolin: S <=2 Enterobacter, Citrobacter, and Serratia may develop resistance during prolonged therapy (3-4 days)      -  Cefepime: S <=2      -  Cefoxitin: S <=8      -  Ceftriaxone: S <=1 Enterobacter, Citrobacter, and Serratia may develop resistance during prolonged therapy      -  Ciprofloxacin: S <=1      -  Ertapenem: S <=0.5      -  Gentamicin: S <=2      -  Levofloxacin: S <=2      -  Meropenem: S <=1      -  Piperacillin/Tazobactam: S <=8      -  Tobramycin: S <=2      -  Trimethoprim/Sulfamethoxazole: S <=2/38      Method Type: LILLY  Organism: Blood Culture PCR (06 Jan 2020 13:25)      -  Acinetobacter baumanii: Nondet      -  Candida albicans: Nondet      -  Candida glabrata: Nondet      -  Candida krusei: Nondet      -  Candida parapsilosis: Nondet      -  Candida tropicalis: Nondet      -  Coagulase negative Staphylococcus: Nondet      -  Enterobacter cloacae complex: Nondet      -  Enterococcus species: Nondet      -  Escherichia coli: Nondet      -  Haemophilus influenzae: Nondet      -  Klebsiella oxytoca: Nondet      -  Klebsiella pneumoniae: Nondet      -  Listeria monocytogenes: Nondet      -  Methicillin resistant Staphylococcus aureus (MRSA): Nondet      -  Multidrug (KPC pos) resistant organism: Nondet      -  Neisseria meningitidis: Nondet      -  Pseudomonas aeruginosa: Nondet      -  Serratia marcescens: Nondet      -  Staphylococcus aureus: Nondet      -  Streptococcus agalactiae (Group B): Nondet      -  Streptococcus pneumoniae: Nondet      -  Streptococcus pyogenes (Group A): Nondet      -  Streptococcus sp. (Not Grp A, B or S pneumoniae): Nondet      -  Vancomycin resistant Enterococcus sp.: Nondet      Method Type: PCR        RADIOLOGY & ADDITIONAL TESTS:     Discussed with Consultants/Other Providers: Patient is a 89y old  Male who presents with a chief complaint of hematuria / UTI (08 Jan 2020 07:39)    Patient seen and examined at bedside.  Pt. with no complaints this morning, no events overnight.    ALLERGIES:  aspirin (Unknown)  penicillin (Unknown)    MEDICATIONS  (STANDING):  amLODIPine   Tablet 10 milliGRAM(s) Oral daily  cefTRIAXone   IVPB 1000 milliGRAM(s) IV Intermittent every 24 hours  escitalopram 5 milliGRAM(s) Oral daily  lisinopril 20 milliGRAM(s) Oral daily  pantoprazole  Injectable 40 milliGRAM(s) IV Push two times a day  senna 2 Tablet(s) Oral at bedtime  tamsulosin 0.4 milliGRAM(s) Oral at bedtime    MEDICATIONS  (PRN):  polyethylene glycol 3350 17 Gram(s) Oral daily PRN Constipation    Vital Signs Last 24 Hrs  T(F): 97.6 (08 Jan 2020 05:07), Max: 98 (07 Jan 2020 10:21)  HR: 81 (08 Jan 2020 05:07) (77 - 82)  BP: 148/99 (08 Jan 2020 05:07) (144/56 - 171/54)  RR: 17 (08 Jan 2020 05:07) (16 - 17)  SpO2: 99% (08 Jan 2020 05:07) (98% - 100%)  I&O's Summary    07 Jan 2020 07:01  -  08 Jan 2020 07:00  --------------------------------------------------------  IN: 250 mL / OUT: 3600 mL / NET: -3350 mL    08 Jan 2020 07:01  -  08 Jan 2020 09:36  --------------------------------------------------------  IN: 360 mL / OUT: 0 mL / NET: 360 mL      PHYSICAL EXAM:  General: NAD, Alert, sitting up in bed.  ENT: MMM, no thrush  Neck: Supple, No JVD  Lungs: good air entry, breathing not labored, clear to auscultation bilaterally  Cardio: RRR, S1/S2, No murmurs  Abdomen: Soft, Nontender, Nondistended; Bowel sounds present  G/U:  +batista, +CBI, no blood in batista bag  Extremities: No calf tenderness, No pitting edema  Neuro:  no focal deficits    LABS:                        8.5    6.68  )-----------( 143      ( 08 Jan 2020 05:45 )             26.9     01-08    144  |  111  |  38  ----------------------------<  84  4.5   |  21  |  1.66    Ca    8.2      08 Jan 2020 05:45      eGFR if Non African American: 35 mL/min/1.73M2 (01-08-20 @ 05:45)  eGFR if African American: 40 mL/min/1.73M2 (01-08-20 @ 05:45)    PT/INR - ( 06 Jan 2020 06:45 )   PT: 13.1 sec;   INR: 1.16 ratio                                       Culture - Blood (collected 06 Jan 2020 18:00)  Source: .Blood Blood-Peripheral  Preliminary Report (08 Jan 2020 03:02):    No growth to date.    Culture - Blood (collected 06 Jan 2020 18:00)  Source: .Blood Blood-Venous  Preliminary Report (08 Jan 2020 03:02):    No growth to date.    Culture - Urine (collected 04 Jan 2020 06:57)  Source: .Urine Clean Catch (Midstream)  Final Report (06 Jan 2020 14:40):    >100,000 CFU/ml Proteus mirabilis  Organism: Proteus mirabilis (06 Jan 2020 14:40)  Organism: Proteus mirabilis (06 Jan 2020 14:40)      -  Amikacin: S <=16      -  Ampicillin: S <=8 These ampicillin results predict results for amoxicillin      -  Ampicillin/Sulbactam: S <=8/4 Enterobacter, Citrobacter, and Serratia may develop resistance during prolonged therapy (3-4 days)      -  Aztreonam: S <=4      -  Cefazolin: S <=8 (MIC_CL_COM_ENTERIC_CEFAZU) For uncomplicated UTI with K. pneumoniae, E. coli, or P. mirablis: LILLY <=16 is sensitive and LILLY >=32 is resistant. This also predicts results for oral agents cefaclor, cefdinir, cefpodoxime, cefprozil, cefuroxime axetil, cephalexin and locarbef for uncomplicated UTI. Note that some isolates may be susceptible to these agents while testing resistant to cefazolin.      -  Cefepime: S <=4      -  Cefoxitin: S <=8      -  Ceftriaxone: S <=1 Enterobacter, Citrobacter, and Serratia may develop resistance during prolonged therapy      -  Ciprofloxacin: S <=1      -  Gentamicin: S <=4      -  Levofloxacin: S <=2      -  Meropenem: S <=1      -  Nitrofurantoin: R >64 Should not be used to treat pyelonephritis      -  Piperacillin/Tazobactam: S <=16      -  Tobramycin: S <=4      -  Trimethoprim/Sulfamethoxazole: S <=2/38      Method Type: LILLY    Culture - Blood (collected 03 Jan 2020 23:01)  Source: .Blood Blood-Peripheral  Preliminary Report (05 Jan 2020 11:01):    No growth to date.    Culture - Blood (collected 03 Jan 2020 23:00)  Source: .Blood Blood-Peripheral  Gram Stain (04 Jan 2020 21:19):    Growth in anaerobic bottle: Gram Negative Rods  Final Report (06 Jan 2020 13:25):    Growth in anaerobic bottle: Proteus mirabilis    "Due to technical problems, Proteus sp. will Not be reported as part of    the BCID panel until further notice"    ***Blood Panel PCR results on this specimen are available    approximately 3 hours after theGram stain result.***    Gram stain, PCR, and/or culture results may not always    correspond due to difference in methodologies.    ************************************************************    This PCR assay was performed using [employer].    The following targets are tested for: Enterococcus,    vancomycin resistant enterococci, Listeria monocytogenes,    coagulase negative staphylococci, S. aureus,    methicillin resistant S. aureus, Streptococcus agalactiae    (Group B), S. pneumoniae, S. pyogenes (Group A),    Acinetobacter baumannii, Enterobacter cloacae, E. coli,    Klebsiella oxytoca, K. pneumoniae, Proteus sp.,    Serratia marcescens, Haemophilus influenzae,    Neisseria meningitidis, Pseudomonas aeruginosa, Candida    albicans, C. glabrata, C krusei,C parapsilosis,    C. tropicalis and the KPC resistance gene.  Organism: Blood Culture PCR  Proteus mirabilis (06 Jan 2020 13:25)  Organism: Proteus mirabilis (06 Jan 2020 13:25)      -  Amikacin: S <=16      -  Ampicillin: S <=8 These ampicillin results predict results for amoxicillin      -  Ampicillin/Sulbactam: S <=4/2 Enterobacter, Citrobacter, and Serratia may develop resistance during prolonged therapy (3-4 days)      -  Aztreonam: S <=4      -  Cefazolin: S <=2 Enterobacter, Citrobacter, and Serratia may develop resistance during prolonged therapy (3-4 days)      -  Cefepime: S <=2      -  Cefoxitin: S <=8      -  Ceftriaxone: S <=1 Enterobacter, Citrobacter, and Serratia may develop resistance during prolonged therapy      -  Ciprofloxacin: S <=1      -  Ertapenem: S <=0.5      -  Gentamicin: S <=2      -  Levofloxacin: S <=2      -  Meropenem: S <=1      -  Piperacillin/Tazobactam: S <=8      -  Tobramycin: S <=2      -  Trimethoprim/Sulfamethoxazole: S <=2/38      Method Type: LILLY  Organism: Blood Culture PCR (06 Jan 2020 13:25)      -  Acinetobacter baumanii: Nondet      -  Candida albicans: Nondet      -  Candida glabrata: Nondet      -  Candida krusei: Nondet      -  Candida parapsilosis: Nondet      -  Candida tropicalis: Nondet      -  Coagulase negative Staphylococcus: Nondet      -  Enterobacter cloacae complex: Nondet      -  Enterococcus species: Nondet      -  Escherichia coli: Nondet      -  Haemophilus influenzae: Nondet      -  Klebsiella oxytoca: Nondet      -  Klebsiella pneumoniae: Nondet      -  Listeria monocytogenes: Nondet      -  Methicillin resistant Staphylococcus aureus (MRSA): Nondet      -  Multidrug (KPC pos) resistant organism: Nondet      -  Neisseria meningitidis: Nondet      -  Pseudomonas aeruginosa: Nondet      -  Serratia marcescens: Nondet      -  Staphylococcus aureus: Nondet      -  Streptococcus agalactiae (Group B): Nondet      -  Streptococcus pneumoniae: Nondet      -  Streptococcus pyogenes (Group A): Nondet      -  Streptococcus sp. (Not Grp A, B or S pneumoniae): Nondet      -  Vancomycin resistant Enterococcus sp.: Nondet      Method Type: PCR        RADIOLOGY & ADDITIONAL TESTS:     Discussed with Consultants/Other Providers:

## 2020-01-08 NOTE — PHYSICAL THERAPY INITIAL EVALUATION ADULT - ADDITIONAL COMMENTS
pt from [facility], states that he uses a walker to ambulate, needs assist for most ADL's

## 2020-01-08 NOTE — PROGRESS NOTE ADULT - SUBJECTIVE AND OBJECTIVE BOX
No complaints this AM.    Batista clear. On abx for UTI.        ROS  General: no fever or chills  GI: no abdominal pain      VITAL SIGNS  Vital Signs Last 24 Hrs  T(C): 36.4 (08 Jan 2020 05:07), Max: 36.7 (07 Jan 2020 10:21)  T(F): 97.6 (08 Jan 2020 05:07), Max: 98 (07 Jan 2020 10:21)  HR: 81 (08 Jan 2020 05:07) (77 - 82)  BP: 148/99        PHYSICAL EXAM:  General: resting comfortably  Abdomen: soft, NT  : batista clear      LABS:                        8.5    6.68  )-----------( 143      ( 08 Jan 2020 05:45 )             26.9     01-07    144  |  113<H>  |  44<H>  ----------------------------<  84  4.7   |  24  |  1.64<H>    Ca    8.2<L>      07 Jan 2020 05:50        Urine Culture: --  Proteus mirabilis  --  Blood Culture PCR  Proteus mirabilis      Prior notes/chart reviewed.

## 2020-01-08 NOTE — PROGRESS NOTE ADULT - ASSESSMENT
89 y/o M PMH Afib, HTN brought from NH for evaluation of bleeding from penis, pt also had low H/H about hbg of 7. Pt was noted to have bout of melena in ED as well as gross hematuria. Pt placed on CBI for irrigation. Pt had elevated INR 1.7, but denied taking AC any time recently, states was on coumadin, but was stopped many years ago. Pt admitted to the ICU for ABLA and coagulopathy with hematuria and melena. Transferred to tele 1/4.     Acute blood loss anemia, secondary to hematuria and GI bleed (fecal occult blood +):  - no new episodes of GI bleed; hematuria resolved and H/H stable:  - s/p 2 units PRBCs on 1/4, check cbc daily  - H&H stabilized, 8.5/26.9  - Urology noted; continue batista, started on Flomax  - GI consulted, no plans now for further interventions (EGD), continue IV protonix, moniter H/H    Coagulopathy   - s/p FFP, vitamin K on 1/4  - INR now 1.16    Thrombocytopenia  - monitor. platelet count stable    Proteus Bacteremia, UTI:  - Continue ceftriaxone for now, f/u final blood culture sensitivities per ID recommendation  - Afebrile, WBCs normal  - cont abx for 14 days total    HTN  - continue Norvasc 10mg   - increase Lisinopril to 20mg   - SBP elevated (138-171)    Afib; paroxysmal:  - not on AC, rate is controlled    Hx of bradycardia   - monitor tele     Chronic kidney disease stage 4 - stable  - Cr improving, 1.64 today. at baseline  - last known Cr 1.6 in 2018  - Monitor    Depression  - on lexapro    DVT ppx  - scds 89 y/o M PMH Afib, HTN brought from NH for evaluation of bleeding from penis, pt also had low H/H about hbg of 7. Pt was noted to have bout of melena in ED as well as gross hematuria. Pt placed on CBI for irrigation. Pt had elevated INR 1.7, but denied taking AC any time recently, states was on coumadin, but was stopped many years ago. Pt admitted to the ICU for ABLA and coagulopathy with hematuria and melena. Transferred to tele 1/4.     Acute blood loss anemia, secondary to hematuria; now resolved and GI bleed (fecal occult blood +):  - s/p 2 units PRBCs on 1/4, check cbc daily  - H&H stabilized, 8.5/26.9  - Urology noted; continue batista, started on Flomax, stop CBI, no hematuria  - GI consulted, no plans now for further interventions (EGD), continue IV protonix, moniter H/H    Proteus Bacteremia, UTI:  - Continue IV ceftriaxone for 3 more days, f/u final blood culture sensitivities per ID recommendation  - Afebrile, WBCs normal    Coagulopathy, thromobocytopenia:  - s/p FFP, vitamin K on 1/4  - INR now 1.16  -monitor platelets, stable 143    HTN  - continue Norvasc 10mg   - increased Lisinopril to 20mg   - SBP elevated (138-171)    Afib; paroxysmal:  - not on AC, rate is controlled    Hx of bradycardia   - monitor tele     Chronic kidney disease stage 4 - stable  - Cr improving, 1.66 today. at baseline  - last known Cr 1.6 in 2018  - Monitor    Depression  - on lexapro    DVT ppx  - scds 89 y/o M PMH Afib, HTN brought from NH for evaluation of bleeding from penis, pt also had low H/H about hbg of 7. Pt was noted to have bout of melena in ED as well as gross hematuria. Pt placed on CBI for irrigation. Pt had elevated INR 1.7, but denied taking AC any time recently, states was on coumadin, but was stopped many years ago. Pt admitted to the ICU for ABLA and coagulopathy with hematuria and melena. Transferred to tele 1/4.     Acute blood loss anemia, secondary to hematuria; now resolved and GI bleed (fecal occult blood +):  - s/p 2 units PRBCs on 1/4, check cbc daily  - H&H stabilized, 8.5/26.9  - Urology noted; continue batista, started on Flomax, stop CBI, no hematuria  - GI consulted, no plans now for further interventions (EGD), continue IV protonix, moniter H/H    Proteus Bacteremia due to hemorrhagic cystitis:  - Continue IV ceftriaxone for 1 more days and if continues to do well, continue on with ceftin 500mg q12 for total of 14 days  - follow up ID recommendation  - Afebrile, WBCs normal    Coagulopathy, thromobocytopenia:  - s/p FFP, vitamin K on 1/4  - INR now 1.16  - thrombocytopenia improving; platelet count 143 today    HTN  - continue Norvasc 10mg   - increased Lisinopril to 20mg   - SBP improved today    Afib; paroxysmal:  - not on AC, rate is controlled    Hx of bradycardia   - monitor tele     Chronic kidney disease stage 4 - stable  - Cr improving, 1.66 today. at baseline  - last known Cr 1.6 in 2018  - Monitor    Depression  - on lexapro    DVT ppx  - scds

## 2020-01-08 NOTE — PROGRESS NOTE ADULT - ASSESSMENT
UTI / bacteremia improved on abx. Hematuria due to hemorrhagic cystitis. Now clear per batista. Retention / constipation.     PLAN:  - Abx as per ID  - Start Flomax  - manage constipation  - would discharge back to NH with batista when stable

## 2020-01-08 NOTE — PHYSICAL THERAPY INITIAL EVALUATION ADULT - STANDING BALANCE: DYNAMIC, REHAB EVAL
[facility]  [address] [facility]  [address]  [place]  [address]  Tel: [phone]    June 18, 2018    Aura Broderick  [address]  [facility] [address]        Dear Ms. Broderick,    The results of your recent urine culture shows pseudomonas which is sensitive to Cipro antibiotics that was prescribed already. No change in meds.     If you have any further questions or problems, please contact our office.      Sincerely,    Agustin Lei MD/william    Results for orders placed or performed in visit on 06/14/18   UA with Microscopic reflex to Culture   Result Value Ref Range    Color Urine Yellow     Appearance Urine Clear     Glucose Urine Negative NEG^Negative mg/dL    Bilirubin Urine Negative NEG^Negative    Ketones Urine Negative NEG^Negative mg/dL    Specific Gravity Urine 1.025 1.003 - 1.035    pH Urine 7.5 (H) 5.0 - 7.0 pH    Protein Albumin Urine 100 (A) NEG^Negative mg/dL    Urobilinogen Urine 0.2 0.2 - 1.0 EU/dL    Nitrite Urine Negative NEG^Negative    Blood Urine Moderate (A) NEG^Negative    Leukocyte Esterase Urine Small (A) NEG^Negative    Source Midstream Urine     WBC Urine >100 (A) OTO5^0 - 5 /HPF    RBC Urine O - 2 OTO2^O - 2 /HPF    Bacteria Urine Many (A) NEG^Negative /HPF   Urine Culture Aerobic Bacterial   Result Value Ref Range    Specimen Description Midstream Urine     Culture Micro (A)      10,000 to 50,000 colonies/mL  Pseudomonas aeruginosa         Susceptibility    Pseudomonas aeruginosa - BYRON     AMIKACIN <=2 Sensitive ug/mL     CEFEPIME 2 Sensitive ug/mL     CEFTAZIDIME 2 Sensitive ug/mL     CIPROFLOXACIN <=0.25 Sensitive ug/mL     GENTAMICIN <=1 Sensitive ug/mL     LEVOFLOXACIN 1 Sensitive ug/mL     Piperacillin/Tazo 8 Sensitive ug/mL     TOBRAMYCIN <=1 Sensitive ug/mL     MEROPENEM 0.5 Sensitive ug/mL           Enclosure: Lab Results    
poor balance

## 2020-01-08 NOTE — CHART NOTE - NSCHARTNOTEFT_GEN_A_CORE
Called by Nurse for patient with episode of bradycardia on tele monitor; HR dropped to 38-40.  Pt asymptomatic, sitting in chair; offers no complaints.  Bradycardia not sustained; HR now 58>70.  EKG ordered; pt with A fib with slow ventricular response with PVC's, 57 bpm.  Reviewed transfer sheets from [facility]; pt has hx of Atrial fib with bradycardia.  Pt. not on a BB; probably due to hx of slow rate.  Thyroid panel and Echo ordered.  Continue to follow patient on telemetry, Cardiology consult referred. Called by Nurse for patient with episode of bradycardia on tele monitor; HR dropped to 38-40.  Pt asymptomatic, sitting in chair; offers no complaints.  Bradycardia not sustained; HR now 58>70.  EKG ordered; pt with A fib with slow ventricular response with PVC's, 57 bpm.  Reviewed transfer sheets from [facility]; pt has hx of Atrial fib with bradycardia.  Pt. not on a BB; probably due to hx of slow rate.  Thyroid panel and Echo ordered.  Continue to follow patient on telemetry, if patient with more episodes of bradycardia will consult Cardiology.  Discussed with Dr. Wilkerson, agrees with plan.

## 2020-01-09 ENCOUNTER — TRANSCRIPTION ENCOUNTER (OUTPATIENT)
Age: 85
End: 2020-01-09

## 2020-01-09 VITALS
HEART RATE: 59 BPM | SYSTOLIC BLOOD PRESSURE: 138 MMHG | TEMPERATURE: 98 F | OXYGEN SATURATION: 100 % | DIASTOLIC BLOOD PRESSURE: 62 MMHG | RESPIRATION RATE: 16 BRPM

## 2020-01-09 LAB
ANION GAP SERPL CALC-SCNC: 10 MMOL/L — SIGNIFICANT CHANGE UP (ref 5–17)
BUN SERPL-MCNC: 42 MG/DL — HIGH (ref 7–23)
CALCIUM SERPL-MCNC: 8.1 MG/DL — LOW (ref 8.4–10.5)
CHLORIDE SERPL-SCNC: 110 MMOL/L — HIGH (ref 96–108)
CO2 SERPL-SCNC: 21 MMOL/L — LOW (ref 22–31)
CREAT SERPL-MCNC: 1.79 MG/DL — HIGH (ref 0.5–1.3)
CULTURE RESULTS: SIGNIFICANT CHANGE UP
GLUCOSE SERPL-MCNC: 93 MG/DL — SIGNIFICANT CHANGE UP (ref 70–99)
HCT VFR BLD CALC: 26.5 % — LOW (ref 39–50)
HGB BLD-MCNC: 8.4 G/DL — LOW (ref 13–17)
MCHC RBC-ENTMCNC: 31.7 GM/DL — LOW (ref 32–36)
MCHC RBC-ENTMCNC: 31.9 PG — SIGNIFICANT CHANGE UP (ref 27–34)
MCV RBC AUTO: 100.8 FL — HIGH (ref 80–100)
NRBC # BLD: 0 /100 WBCS — SIGNIFICANT CHANGE UP (ref 0–0)
PLATELET # BLD AUTO: 140 K/UL — LOW (ref 150–400)
POTASSIUM SERPL-MCNC: 5.2 MMOL/L — SIGNIFICANT CHANGE UP (ref 3.5–5.3)
POTASSIUM SERPL-SCNC: 5.2 MMOL/L — SIGNIFICANT CHANGE UP (ref 3.5–5.3)
RBC # BLD: 2.63 M/UL — LOW (ref 4.2–5.8)
RBC # FLD: 13.7 % — SIGNIFICANT CHANGE UP (ref 10.3–14.5)
SODIUM SERPL-SCNC: 141 MMOL/L — SIGNIFICANT CHANGE UP (ref 135–145)
SPECIMEN SOURCE: SIGNIFICANT CHANGE UP
T3 SERPL-MCNC: 48 NG/DL — LOW (ref 80–200)
T4 AB SER-ACNC: 6 UG/DL — SIGNIFICANT CHANGE UP (ref 4.6–12)
TSH SERPL-MCNC: 1.13 UIU/ML — SIGNIFICANT CHANGE UP (ref 0.27–4.2)
WBC # BLD: 7.2 K/UL — SIGNIFICANT CHANGE UP (ref 3.8–10.5)
WBC # FLD AUTO: 7.2 K/UL — SIGNIFICANT CHANGE UP (ref 3.8–10.5)

## 2020-01-09 PROCEDURE — 36415 COLL VENOUS BLD VENIPUNCTURE: CPT

## 2020-01-09 PROCEDURE — P9016: CPT

## 2020-01-09 PROCEDURE — 99239 HOSP IP/OBS DSCHRG MGMT >30: CPT

## 2020-01-09 PROCEDURE — 36430 TRANSFUSION BLD/BLD COMPNT: CPT

## 2020-01-09 PROCEDURE — 71045 X-RAY EXAM CHEST 1 VIEW: CPT

## 2020-01-09 PROCEDURE — P9017: CPT

## 2020-01-09 PROCEDURE — 87186 SC STD MICRODIL/AGAR DIL: CPT

## 2020-01-09 PROCEDURE — 85027 COMPLETE CBC AUTOMATED: CPT

## 2020-01-09 PROCEDURE — 93005 ELECTROCARDIOGRAM TRACING: CPT

## 2020-01-09 PROCEDURE — 85610 PROTHROMBIN TIME: CPT

## 2020-01-09 PROCEDURE — 83735 ASSAY OF MAGNESIUM: CPT

## 2020-01-09 PROCEDURE — 51702 INSERT TEMP BLADDER CATH: CPT

## 2020-01-09 PROCEDURE — 96365 THER/PROPH/DIAG IV INF INIT: CPT | Mod: XU

## 2020-01-09 PROCEDURE — 80053 COMPREHEN METABOLIC PANEL: CPT

## 2020-01-09 PROCEDURE — 85730 THROMBOPLASTIN TIME PARTIAL: CPT

## 2020-01-09 PROCEDURE — 87150 DNA/RNA AMPLIFIED PROBE: CPT

## 2020-01-09 PROCEDURE — 80048 BASIC METABOLIC PNL TOTAL CA: CPT

## 2020-01-09 PROCEDURE — 97162 PT EVAL MOD COMPLEX 30 MIN: CPT

## 2020-01-09 PROCEDURE — 87086 URINE CULTURE/COLONY COUNT: CPT

## 2020-01-09 PROCEDURE — 83605 ASSAY OF LACTIC ACID: CPT

## 2020-01-09 PROCEDURE — 86900 BLOOD TYPING SEROLOGIC ABO: CPT

## 2020-01-09 PROCEDURE — 86923 COMPATIBILITY TEST ELECTRIC: CPT

## 2020-01-09 PROCEDURE — 99285 EMERGENCY DEPT VISIT HI MDM: CPT | Mod: 25

## 2020-01-09 PROCEDURE — 84436 ASSAY OF TOTAL THYROXINE: CPT

## 2020-01-09 PROCEDURE — 86850 RBC ANTIBODY SCREEN: CPT

## 2020-01-09 PROCEDURE — 86901 BLOOD TYPING SEROLOGIC RH(D): CPT

## 2020-01-09 PROCEDURE — 82272 OCCULT BLD FECES 1-3 TESTS: CPT

## 2020-01-09 PROCEDURE — 81001 URINALYSIS AUTO W/SCOPE: CPT

## 2020-01-09 PROCEDURE — 96375 TX/PRO/DX INJ NEW DRUG ADDON: CPT | Mod: XU

## 2020-01-09 PROCEDURE — 84443 ASSAY THYROID STIM HORMONE: CPT

## 2020-01-09 PROCEDURE — 84100 ASSAY OF PHOSPHORUS: CPT

## 2020-01-09 PROCEDURE — 76770 US EXAM ABDO BACK WALL COMP: CPT

## 2020-01-09 PROCEDURE — 84145 PROCALCITONIN (PCT): CPT

## 2020-01-09 PROCEDURE — 84480 ASSAY TRIIODOTHYRONINE (T3): CPT

## 2020-01-09 PROCEDURE — 87040 BLOOD CULTURE FOR BACTERIA: CPT

## 2020-01-09 PROCEDURE — 96361 HYDRATE IV INFUSION ADD-ON: CPT

## 2020-01-09 PROCEDURE — 74176 CT ABD & PELVIS W/O CONTRAST: CPT

## 2020-01-09 RX ORDER — PANTOPRAZOLE SODIUM 20 MG/1
1 TABLET, DELAYED RELEASE ORAL
Qty: 0 | Refills: 0 | DISCHARGE

## 2020-01-09 RX ORDER — LISINOPRIL 2.5 MG/1
1 TABLET ORAL
Qty: 0 | Refills: 0 | DISCHARGE

## 2020-01-09 RX ORDER — CEFUROXIME AXETIL 250 MG
1 TABLET ORAL
Qty: 0 | Refills: 0 | DISCHARGE

## 2020-01-09 RX ORDER — CEFTRIAXONE 500 MG/1
1000 INJECTION, POWDER, FOR SOLUTION INTRAMUSCULAR; INTRAVENOUS ONCE
Refills: 0 | Status: COMPLETED | OUTPATIENT
Start: 2020-01-09 | End: 2020-01-09

## 2020-01-09 RX ORDER — LISINOPRIL 2.5 MG/1
1 TABLET ORAL
Qty: 0 | Refills: 0 | DISCHARGE
Start: 2020-01-09

## 2020-01-09 RX ORDER — TAMSULOSIN HYDROCHLORIDE 0.4 MG/1
1 CAPSULE ORAL
Qty: 0 | Refills: 0 | DISCHARGE
Start: 2020-01-09

## 2020-01-09 RX ADMIN — LISINOPRIL 20 MILLIGRAM(S): 2.5 TABLET ORAL at 06:06

## 2020-01-09 RX ADMIN — PANTOPRAZOLE SODIUM 40 MILLIGRAM(S): 20 TABLET, DELAYED RELEASE ORAL at 06:06

## 2020-01-09 RX ADMIN — AMLODIPINE BESYLATE 10 MILLIGRAM(S): 2.5 TABLET ORAL at 06:06

## 2020-01-09 RX ADMIN — CEFTRIAXONE 100 MILLIGRAM(S): 500 INJECTION, POWDER, FOR SOLUTION INTRAMUSCULAR; INTRAVENOUS at 13:32

## 2020-01-09 RX ADMIN — ESCITALOPRAM OXALATE 5 MILLIGRAM(S): 10 TABLET, FILM COATED ORAL at 12:33

## 2020-01-09 NOTE — PROGRESS NOTE ADULT - REASON FOR ADMISSION
hematuria
proteus bacteremia, hematuria
uti
Hematuria
hematuria / UTI
hematuria, anemia
uti hematuria
hematuria
melena, hematuria

## 2020-01-09 NOTE — DISCHARGE NOTE NURSING/CASE MANAGEMENT/SOCIAL WORK - PATIENT PORTAL LINK FT
You can access the FollowMyHealth Patient Portal offered by [facility] by registering at the following website: [email or web address]. By joining [employer]’s FollowMyHealth portal, you will also be able to view your health information using other applications (apps) compatible with our system.

## 2020-01-09 NOTE — PROGRESS NOTE ADULT - SUBJECTIVE AND OBJECTIVE BOX
Patient is a 89y old  Male who presents with a chief complaint of proteus bacteremia, hematuria (09 Jan 2020 11:07)      Patient seen and examined at bedside. No overnight events reported. Pt has no complaints. urine is clear.   Pt found to have a fib with bradycardia, HR lowest around 30s but recovers to >50 and pt asymptomatic. Pt also found to have a couple short pauses, ~2sec each time.     ALLERGIES:  aspirin (Unknown)  penicillin (Unknown)    MEDICATIONS  (STANDING):  amLODIPine   Tablet 10 milliGRAM(s) Oral daily  cefTRIAXone   IVPB 1000 milliGRAM(s) IV Intermittent once  escitalopram 5 milliGRAM(s) Oral daily  lisinopril 20 milliGRAM(s) Oral daily  pantoprazole  Injectable 40 milliGRAM(s) IV Push two times a day  senna 2 Tablet(s) Oral at bedtime  tamsulosin 0.4 milliGRAM(s) Oral at bedtime    MEDICATIONS  (PRN):  polyethylene glycol 3350 17 Gram(s) Oral daily PRN Constipation    Vital Signs Last 24 Hrs  T(F): 98.2 (09 Jan 2020 10:35), Max: 98.4 (08 Jan 2020 22:34)  HR: 60 (09 Jan 2020 10:35) (56 - 77)  BP: 124/52 (09 Jan 2020 10:35) (124/52 - 154/51)  RR: 16 (09 Jan 2020 10:35) (16 - 17)  SpO2: 100% (09 Jan 2020 10:35) (98% - 100%)  I&O's Summary    08 Jan 2020 07:01  -  09 Jan 2020 07:00  --------------------------------------------------------  IN: 840 mL / OUT: 1500 mL / NET: -660 mL      GENERAL: NAD  HEAD:  Atraumatic, Normocephalic  EYES: EOMI, PERRLA, sclera clear  NECK: Supple  CHEST/LUNG: Clear to auscultation bilaterally; No wheeze  HEART: Regular rate and rhythm; + murmur  ABDOMEN: Soft, Nontender, Nondistended; Bowel sounds present  EXTREMITIES:  no edema. chronic vascular changes  NEUROLOGY: non-focal      LABS:                        8.4    7.20  )-----------( 140      ( 09 Jan 2020 06:30 )             26.5     01-09    141  |  110  |  42  ----------------------------<  93  5.2   |  21  |  1.79    Ca    8.1      09 Jan 2020 06:30          eGFR if Non African American: 32 mL/min/1.73M2 (01-09-20 @ 06:30)  eGFR if African American: 37 mL/min/1.73M2 (01-09-20 @ 06:30)        TSH 1.13   TSH with FT4 reflex --  Total T3 48      Culture - Blood (collected 06 Jan 2020 18:00)  Source: .Blood Blood-Peripheral  Preliminary Report (08 Jan 2020 03:02):    No growth to date.    Culture - Blood (collected 06 Jan 2020 18:00)  Source: .Blood Blood-Venous  Preliminary Report (08 Jan 2020 03:02):    No growth to date.    Culture - Urine (collected 04 Jan 2020 06:57)  Source: .Urine Clean Catch (Midstream)  Final Report (06 Jan 2020 14:40):    >100,000 CFU/ml Proteus mirabilis  Organism: Proteus mirabilis (06 Jan 2020 14:40)  Organism: Proteus mirabilis (06 Jan 2020 14:40)      -  Amikacin: S <=16      -  Ampicillin: S <=8 These ampicillin results predict results for amoxicillin      -  Ampicillin/Sulbactam: S <=8/4 Enterobacter, Citrobacter, and Serratia may develop resistance during prolonged therapy (3-4 days)      -  Aztreonam: S <=4      -  Cefazolin: S <=8 (MIC_CL_COM_ENTERIC_CEFAZU) For uncomplicated UTI with K. pneumoniae, E. coli, or P. mirablis: LILLY <=16 is sensitive and LILLY >=32 is resistant. This also predicts results for oral agents cefaclor, cefdinir, cefpodoxime, cefprozil, cefuroxime axetil, cephalexin and locarbef for uncomplicated UTI. Note that some isolates may be susceptible to these agents while testing resistant to cefazolin.      -  Cefepime: S <=4      -  Cefoxitin: S <=8      -  Ceftriaxone: S <=1 Enterobacter, Citrobacter, and Serratia may develop resistance during prolonged therapy      -  Ciprofloxacin: S <=1      -  Gentamicin: S <=4      -  Levofloxacin: S <=2      -  Meropenem: S <=1      -  Nitrofurantoin: R >64 Should not be used to treat pyelonephritis      -  Piperacillin/Tazobactam: S <=16      -  Tobramycin: S <=4      -  Trimethoprim/Sulfamethoxazole: S <=2/38      Method Type: LILLY    Culture - Blood (collected 03 Jan 2020 23:01)  Source: .Blood Blood-Peripheral  Final Report (09 Jan 2020 11:01):    No growth at 5 days.    Culture - Blood (collected 03 Jan 2020 23:00)  Source: .Blood Blood-Peripheral  Gram Stain (04 Jan 2020 21:19):    Growth in anaerobic bottle: Gram Negative Rods  Final Report (06 Jan 2020 13:25):    Growth in anaerobic bottle: Proteus mirabilis    "Due to technical problems, Proteus sp. will Not be reported as part of    the BCID panel until further notice"    ***Blood Panel PCR results on this specimen are available    approximately 3 hours after theGram stain result.***    Gram stain, PCR, and/or culture results may not always    correspond due to difference in methodologies.    ************************************************************    This PCR assay was performed using [employer].    The following targets are tested for: Enterococcus,    vancomycin resistant enterococci, Listeria monocytogenes,    coagulase negative staphylococci, S. aureus,    methicillin resistant S. aureus, Streptococcus agalactiae    (Group B), S. pneumoniae, S. pyogenes (Group A),    Acinetobacter baumannii, Enterobacter cloacae, E. coli,    Klebsiella oxytoca, K. pneumoniae, Proteus sp.,    Serratia marcescens, Haemophilus influenzae,    Neisseria meningitidis, Pseudomonas aeruginosa, Candida    albicans, C. glabrata, C krusei,C parapsilosis,    C. tropicalis and the KPC resistance gene.  Organism: Blood Culture PCR  Proteus mirabilis (06 Jan 2020 13:25)  Organism: Proteus mirabilis (06 Jan 2020 13:25)      -  Amikacin: S <=16      -  Ampicillin: S <=8 These ampicillin results predict results for amoxicillin      -  Ampicillin/Sulbactam: S <=4/2 Enterobacter, Citrobacter, and Serratia may develop resistance during prolonged therapy (3-4 days)      -  Aztreonam: S <=4      -  Cefazolin: S <=2 Enterobacter, Citrobacter, and Serratia may develop resistance during prolonged therapy (3-4 days)      -  Cefepime: S <=2      -  Cefoxitin: S <=8      -  Ceftriaxone: S <=1 Enterobacter, Citrobacter, and Serratia may develop resistance during prolonged therapy      -  Ciprofloxacin: S <=1      -  Ertapenem: S <=0.5      -  Gentamicin: S <=2      -  Levofloxacin: S <=2      -  Meropenem: S <=1      -  Piperacillin/Tazobactam: S <=8      -  Tobramycin: S <=2      -  Trimethoprim/Sulfamethoxazole: S <=2/38      Method Type: LILLY  Organism: Blood Culture PCR (06 Jan 2020 13:25)      -  Acinetobacter baumanii: Nondet      -  Candida albicans: Nondet      -  Candida glabrata: Nondet      -  Candida krusei: Nondet      -  Candida parapsilosis: Nondet      -  Candida tropicalis: Nondet      -  Coagulase negative Staphylococcus: Nondet      -  Enterobacter cloacae complex: Nondet      -  Enterococcus species: Nondet      -  Escherichia coli: Nondet      -  Haemophilus influenzae: Nondet      -  Klebsiella oxytoca: Nondet      -  Klebsiella pneumoniae: Nondet      -  Listeria monocytogenes: Nondet      -  Methicillin resistant Staphylococcus aureus (MRSA): Nondet      -  Multidrug (KPC pos) resistant organism: Nondet      -  Neisseria meningitidis: Nondet      -  Pseudomonas aeruginosa: Nondet      -  Serratia marcescens: Nondet      -  Staphylococcus aureus: Nondet      -  Streptococcus agalactiae (Group B): Nondet      -  Streptococcus pneumoniae: Nondet      -  Streptococcus pyogenes (Group A): Nondet      -  Streptococcus sp. (Not Grp A, B or S pneumoniae): Nondet      -  Vancomycin resistant Enterococcus sp.: Nondet      Method Type: PCR      RADIOLOGY & ADDITIONAL TESTS:  Care Discussed with Consultants/Other Providers:

## 2020-01-09 NOTE — DISCHARGE NOTE PROVIDER - NSDCMRMEDTOKEN_GEN_ALL_CORE_FT
bisacodyl 5 mg oral delayed release tablet: 1 tab(s) orally once a day, As Needed  cefuroxime 500 mg oral tablet: 1 tab(s) orally every 12 hours for 5 more days till 1/14/2020  ferrous sulfate 325 mg (65 mg elemental iron) oral tablet: orally 2 times a day  Lexapro 5 mg oral tablet: orally once a day  lisinopril 20 mg oral tablet: 1 tab(s) orally once a day  Melatonin 3 mg oral tablet: 1 tab(s) orally once a day (at bedtime)  Nephro-Alberto Rx oral tablet: 1 tab(s) orally once a day  Norvasc 10 mg oral tablet: 1 tab(s) orally once a day  Protonix 40 mg oral delayed release tablet: 1 tab(s) orally once a day  Senna Lax 8.6 mg oral tablet: 1 tab(s) orally once a day (at bedtime)  tamsulosin 0.4 mg oral capsule: 1 cap(s) orally once a day (at bedtime)

## 2020-01-09 NOTE — DISCHARGE NOTE NURSING/CASE MANAGEMENT/SOCIAL WORK - NSDCPEWEB_GEN_ALL_CORE
[place] website --- [email or web address] Tobacco [email or web address] website --- [email or web address]

## 2020-01-09 NOTE — DIETITIAN INITIAL EVALUATION ADULT. - OTHER INFO
89y old  Male who presents with a chief complaint of hematuria / UTI . Patient noted with confusion , unable to obtain diet history , noted with poor dentition .Patient receiving a Mechanical soft diet~50% of meal consumed. Patient noted receptive to PO nutrition supplements , patient states "he eats enough", noted with evidence of moderate malnutrition, muscle wasting/loss of body noted(temporal/Orbital/Clavicle/scapular)

## 2020-01-09 NOTE — CHART NOTE - NSCHARTNOTEFT_GEN_A_CORE
Upon Nutritional Assessment by the Registered Dietitian your patient was determined to meet criteria / has evidence of the following diagnosis/diagnoses:                 [X]  Moderate Protein Calorie Malnutrition            Findings as based on:  [X] Comprehensive Nutrition Assessment   [X] Nutrition Focused Physical Exam -Loss of body fat & noted muscle wasting ( Temporal, Orbital, Clavicle, Scapular, Bicep, Tricep, Thigh, Calf  Wasting Observed      Nutrition Plan/Recommendations:    1) Will provide "Magic Cup" BID (290cal/9gpro) 4oz. patient not receptive to po nutrition supplements    PROVIDER Section:   By signing this assessment you are acknowledging and agree with the diagnosis/diagnoses assigned by the Registered Dietitian    Nelly Arboleda RDN

## 2020-01-09 NOTE — DISCHARGE NOTE PROVIDER - CARE PROVIDER_API CALL
Gunner Antonio)  Urology  [address], [address]  [place], [place] [id number]  Phone: [phone]  Fax: [phone]  Follow Up Time:

## 2020-01-09 NOTE — DISCHARGE NOTE PROVIDER - HOSPITAL COURSE
89 y/o M PMH Afib, HTN brought from NH for evaluation of bleeding from penis, pt also had low H/H about hbg of 7. Pt was noted to have bout of melena in ED as well as gross hematuria. Pt placed on CBI for irrigation. Pt had elevated INR 1.7, but denied taking AC any time recently, states was on coumadin, but was stopped many years ago. Pt was found ot have hemorrhagic cystitis with klebsiella bacteremia and sepsis resolved with abx. Per , pt can go with batista on discharge.. There were no new episodes of melena seen and H/H stable. Pt seen by GI, no GI interventions required this admission. Pt is stable for discharge. 89 y/o M PMH Afib, HTN brought from NH for evaluation of bleeding from penis, pt also had low H/H about hbg of 7. Pt was noted to have bout of melena in ED as well as gross hematuria. Pt placed on CBI for irrigation. Pt had elevated INR 1.7, but denied taking AC any time recently, states was on coumadin, but was stopped many years ago. Pt was found ot have hemorrhagic cystitis with proteus bacteremia and sepsis resolved with abx. Per , pt can go with batista on discharge.. There were no new episodes of melena seen and H/H stable. Pt seen by GI, no GI interventions required this admission. Pt is stable for discharge.

## 2020-01-09 NOTE — PROGRESS NOTE ADULT - SUBJECTIVE AND OBJECTIVE BOX
CC: f/u for uti, proteus bacteremia    Patient reports  he feels fine today  REVIEW OF SYSTEMS:  All other review of systems negative (Comprehensive ROS)    Antimicrobials Day #  :6/14  cefTRIAXone   IVPB 1000 milliGRAM(s) IV Intermittent every 24 hours    Other Medications Reviewed    T(F): 98.2 (01-09-20 @ 10:35), Max: 98.4 (01-08-20 @ 22:34)  HR: 60 (01-09-20 @ 10:35)  BP: 124/52 (01-09-20 @ 10:35)  RR: 16 (01-09-20 @ 10:35)  SpO2: 100% (01-09-20 @ 10:35)  Wt(kg): --    PHYSICAL EXAM:  General: alert, no acute distress  Eyes:  anicteric, no conjunctival injection, no discharge  Oropharynx: no lesions or injection 	  Neck: supple, without adenopathy  Lungs: clear to auscultation  Heart: irregular rate and rhythm; no murmur, rubs or gallops  Abdomen: soft, nondistended, nontender, without mass or organomegaly  Skin: no lesions  Extremities: no clubbing, cyanosis, or edema  Neurologic: alert, oriented, moves all extremities    LAB RESULTS:                        8.4    7.20  )-----------( 140      ( 09 Jan 2020 06:30 )             26.5     01-09    141  |  110<H>  |  42<H>  ----------------------------<  93  5.2   |  21<L>  |  1.79<H>    Ca    8.1<L>      09 Jan 2020 06:30          MICROBIOLOGY:  RECENT CULTURES:  01-06 @ 18:00 .Blood Blood-Venous     No growth to date.          RADIOLOGY REVIEWED:  < from: CT Abdomen and Pelvis No Cont (01.04.20 @ 02:23) >  EXAM:  CT ABDOMEN AND PELVIS      PROCEDURE DATE:  01/04/2020        INTERPRETATION:  CLINICAL INFORMATION: Abdominal pain. Blood in urine.    COMPARISON: None.    PROCEDURE:   CT of the Abdomen and Pelvis was performed without intravenous contrast.   Intravenous contrast: None.  Oral contrast: None.  Sagittal and coronal reformats were performed.    FINDINGS:    LOWER CHEST: Small left pleural effusion with mural thickening probable rounded atelectasis in the left lower lobe. Left pleural calcification. Cardiomegaly.     LIVER: Within normal limits.  BILE DUCTS: Normal caliber.   GALLBLADDER: Within normal limits.  SPLEEN: Within normal limits.  PANCREAS: Within normal limits.  ADRENALS: Within normal limits.  KIDNEYS/URETERS: Mild bilateral hydroureteronephrosis to the level of the urinary bladder.     BLADDER: Mural thickening of the urinary bladder with surrounding inflammatory change. Foci of air within the bladder, which contains a Batista catheter balloon.  REPRODUCTIVE ORGANS: Theprostate is within normal limits.    BOWEL: No bowel obstruction. Normal appendix. Large amount of stool within the rectum.  PERITONEUM: No ascites.  VESSELS:  Atherosclerotic change of the abdominal aorta and its branches.  RETROPERITONEUM: No lymphadenopathy.    ABDOMINAL WALL: Within normal limits.  BONES: Degenerative changes of the spine. Chronic ununited right femoral neck fracture.    IMPRESSION:   Cystitis.    Mild bilateral hydroureteronephrosis to the level of the urinary bladder.    Large amount of stool within the rectum.    < end of copied text >      Assessment:  Elderly man admitted for hematuria, found to have proteus bacteremia from gu source, severe constipation likely playing role in some component of urinary retention. Had cbi and hematuria stopped now has batista. Bacteremia is controlled.   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

## 2020-01-09 NOTE — DISCHARGE NOTE PROVIDER - NSDCCPCAREPLAN_GEN_ALL_CORE_FT
PRINCIPAL DISCHARGE DIAGNOSIS  Diagnosis: Gastrointestinal hemorrhage with melena  Assessment and Plan of Treatment: resolved. H/H stable      SECONDARY DISCHARGE DIAGNOSES  Diagnosis: Cystitis with hematuria  Assessment and Plan of Treatment: resolved. continue antibiotics as prescirbed

## 2020-01-09 NOTE — DISCHARGE NOTE NURSING/CASE MANAGEMENT/SOCIAL WORK - NSDCPEEMAIL_GEN_ALL_CORE
[facility] for Tobacco [facility] email [email or web address]

## 2020-01-09 NOTE — PROGRESS NOTE ADULT - ASSESSMENT
89 y/o M PMH Afib, HTN brought from NH for evaluation of bleeding from penis, pt also had low H/H about hbg of 7. Pt was noted to have bout of melena in ED as well as gross hematuria. Pt placed on CBI for irrigation. Pt had elevated INR 1.7, but denied taking AC any time recently, states was on coumadin, but was stopped many years ago. Pt admitted to the ICU for ABLA and coagulopathy with hematuria and melena. Transferred to tele 1/4.     Acute blood loss anemia, secondary to hematuria; now resolved and GI bleed (fecal occult blood +):  - s/p 2 units PRBCs on 1/4, check cbc daily  - H&H stabilized, 8.5/26.9  - Urology noted; discharge on batista, started on Flomax, stop CBI, no hematuria  - GI consulted, no plans now for further interventions (EGD), continue protonix, moniter H/H    Proteus Bacteremia due to hemorrhagic cystitis:  - Continue IV ceftriaxone today and then switch to ceftin 500mg q12 for total of 14 days of abx.  - appreciate ID  - Afebrile, WBCs normal    Coagulopathy, thromobocytopenia:  - s/p FFP, vitamin K on 1/4  - INR now 1.16  - thrombocytopenia improving; platelet count 140 today    HTN  - continue Norvasc 10mg   - increased Lisinopril to 20mg   - SBP controlled (124-154)    Afib; paroxysmal with history of bradycardia  - not on AC, not on rate control medications.   - HR controlled   - short period of A fib with Bradycardia noted on tele, HR in the 30s. pt asymptomatic   - TSH wnl    Chronic kidney disease stage 4 - stable  - Cr 1.79 toady  - last known Cr 1.6 in 2018    Depression  - on lexapro    DVT ppx  - scds    DISPO: discharge back to [facility] today.  spend 33 min on discharge today

## 2020-01-12 LAB
CULTURE RESULTS: SIGNIFICANT CHANGE UP
CULTURE RESULTS: SIGNIFICANT CHANGE UP
SPECIMEN SOURCE: SIGNIFICANT CHANGE UP
SPECIMEN SOURCE: SIGNIFICANT CHANGE UP

## 2020-01-15 ENCOUNTER — APPOINTMENT (OUTPATIENT)
Dept: SURGERY | Facility: ASSISTED LIVING FACILITY | Age: 85
End: 2020-01-15
Payer: MEDICARE

## 2020-01-15 PROCEDURE — 99304 1ST NF CARE SF/LOW MDM 25: CPT

## 2020-01-16 PROBLEM — E78.5 HYPERLIPIDEMIA, UNSPECIFIED: Chronic | Status: ACTIVE | Noted: 2020-01-04

## 2020-01-16 PROBLEM — E87.5 HYPERKALEMIA: Chronic | Status: ACTIVE | Noted: 2020-01-04

## 2020-01-16 PROBLEM — N18.9 CHRONIC KIDNEY DISEASE, UNSPECIFIED: Chronic | Status: ACTIVE | Noted: 2020-01-04

## 2020-01-16 PROBLEM — N40.0 BENIGN PROSTATIC HYPERPLASIA WITHOUT LOWER URINARY TRACT SYMPTOMS: Chronic | Status: ACTIVE | Noted: 2020-01-04

## 2020-01-16 PROBLEM — I10 ESSENTIAL (PRIMARY) HYPERTENSION: Chronic | Status: ACTIVE | Noted: 2020-01-03

## 2020-01-16 PROBLEM — I51.9 HEART DISEASE, UNSPECIFIED: Chronic | Status: ACTIVE | Noted: 2020-01-04

## 2020-01-16 PROBLEM — I48.91 UNSPECIFIED ATRIAL FIBRILLATION: Chronic | Status: ACTIVE | Noted: 2020-01-03

## 2020-01-16 PROBLEM — G47.00 INSOMNIA, UNSPECIFIED: Chronic | Status: ACTIVE | Noted: 2020-01-04

## 2020-01-16 PROBLEM — I26.99 OTHER PULMONARY EMBOLISM WITHOUT ACUTE COR PULMONALE: Chronic | Status: ACTIVE | Noted: 2020-01-04

## 2020-01-22 ENCOUNTER — APPOINTMENT (OUTPATIENT)
Dept: SURGERY | Facility: ASSISTED LIVING FACILITY | Age: 85
End: 2020-01-22
Payer: MEDICARE

## 2020-01-22 PROCEDURE — 99307 SBSQ NF CARE SF MDM 10: CPT

## 2020-01-29 ENCOUNTER — APPOINTMENT (OUTPATIENT)
Dept: SURGERY | Facility: ASSISTED LIVING FACILITY | Age: 85
End: 2020-01-29
Payer: MEDICARE

## 2020-01-29 PROCEDURE — 99307 SBSQ NF CARE SF MDM 10: CPT

## 2020-09-02 NOTE — H&P ADULT - NSHPLANGLIMITEDENGLISH_GEN_A_CORE
Please follow-up with your primary care doctor for reassessment.  You may use acetaminophen (less than 3 g per 24 hours) for symptom control.  Please return if any symptoms change or worsen.  
No

## 2021-05-17 NOTE — CONSULT NOTE ADULT - CONSULT REQUESTED DATE/TIME
Benefits, risks, and possible complications of procedure explained to patient/caregiver who verbalized understanding and gave verbal consent.
05-Jan-2020 11:17
06-Jan-2020 09:53
06-Jan-2020 16:26

## 2021-11-12 NOTE — PROVIDER CONTACT NOTE (EICU) - ASSESSMENT
pre cath fellow assessment 1. Goals of care.  2. Coagulopathy from unclear etiology (normal LFTs, maybe he is on AC?)  3. WILVER with hyperkalemia, unclear if this is acute on chronic.  4. A fib

## 2022-08-08 NOTE — PROVIDER CONTACT NOTE (OTHER) - SITUATION
Start over-the-counter lotrisone cream to the right foot 2 times a day for 2 weeks.   
Covering for CAROL Perea. Received a call from tele tech that Pt dropped down to the 30s, currently in the 40s.
